# Patient Record
Sex: MALE | Race: WHITE | NOT HISPANIC OR LATINO | Employment: FULL TIME | URBAN - METROPOLITAN AREA
[De-identification: names, ages, dates, MRNs, and addresses within clinical notes are randomized per-mention and may not be internally consistent; named-entity substitution may affect disease eponyms.]

---

## 2019-10-31 ENCOUNTER — OFFICE VISIT - HEALTHEAST (OUTPATIENT)
Dept: FAMILY MEDICINE | Facility: CLINIC | Age: 49
End: 2019-10-31

## 2019-10-31 DIAGNOSIS — H60.391 INFECTIVE OTITIS EXTERNA, RIGHT: ICD-10-CM

## 2020-05-06 ENCOUNTER — HOSPITAL ENCOUNTER (EMERGENCY)
Facility: CLINIC | Age: 50
Discharge: HOME | End: 2020-05-06
Attending: EMERGENCY MEDICINE | Admitting: EMERGENCY MEDICINE
Payer: COMMERCIAL

## 2020-05-06 ENCOUNTER — APPOINTMENT (OUTPATIENT)
Dept: GENERAL RADIOLOGY | Facility: CLINIC | Age: 50
End: 2020-05-06
Attending: EMERGENCY MEDICINE
Payer: COMMERCIAL

## 2020-05-06 VITALS
OXYGEN SATURATION: 97 % | SYSTOLIC BLOOD PRESSURE: 128 MMHG | HEART RATE: 64 BPM | RESPIRATION RATE: 21 BRPM | TEMPERATURE: 98.7 F | DIASTOLIC BLOOD PRESSURE: 87 MMHG

## 2020-05-06 DIAGNOSIS — R07.9 CHEST PAIN, UNSPECIFIED TYPE: ICD-10-CM

## 2020-05-06 DIAGNOSIS — G89.29 CHRONIC BILATERAL THORACIC BACK PAIN: ICD-10-CM

## 2020-05-06 DIAGNOSIS — M54.6 CHRONIC BILATERAL THORACIC BACK PAIN: ICD-10-CM

## 2020-05-06 LAB
INTERPRETATION ECG - MUSE: NORMAL
TROPONIN I SERPL-MCNC: <0.015 UG/L (ref 0–0.04)

## 2020-05-06 PROCEDURE — 84484 ASSAY OF TROPONIN QUANT: CPT | Performed by: EMERGENCY MEDICINE

## 2020-05-06 PROCEDURE — 99285 EMERGENCY DEPT VISIT HI MDM: CPT | Mod: 25

## 2020-05-06 PROCEDURE — 71045 X-RAY EXAM CHEST 1 VIEW: CPT

## 2020-05-06 PROCEDURE — 93005 ELECTROCARDIOGRAM TRACING: CPT

## 2020-05-06 ASSESSMENT — ENCOUNTER SYMPTOMS: BACK PAIN: 1

## 2020-05-06 NOTE — LETTER
May 6, 2020      To Whom It May Concern:      Harley Isidro was seen in our Emergency Department today, 05/06/20.  I expect her condition to improve over the next 2-3 days.  He may return to work when improved.    Sincerely,        Tresa ENNIS RN

## 2020-05-06 NOTE — ED AVS SNAPSHOT
[facility] Emergency Department  [address]  [facility] [address]  Phone:  [phone]  Fax:  [phone]                                    Harley Isidro   MRN: [id number]    Department:  [facility] Emergency Department   Date of Visit:  5/6/2020           After Visit Summary Signature Page    I have received my discharge instructions, and my questions have been answered. I have discussed any challenges I see with this plan with the nurse or doctor.    ..........................................................................................................................................  Patient/Patient Representative Signature      ..........................................................................................................................................  Patient Representative Print Name and Relationship to Patient    ..................................................               ................................................  Date                                   Time    ..........................................................................................................................................  Reviewed by Signature/Title    ...................................................              ..............................................  Date                                               Time          22EPIC Rev 08/18

## 2020-05-06 NOTE — LETTER
May 6, 2020      To Whom It May Concern:      Harley Isidro was seen in our Emergency Department today, 05/06/20.  He may return to work on Friday, 05/08/2020    Sincerely,        Tresa ENNIS RN
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
## 2020-05-06 NOTE — PROGRESS NOTES
Pt reports that he has a history of nerve damage and the pain is not new. 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 States he's out of pain medication, ran out about 1 month ago. Denies cardiac history. Referred by [facility] for cardiac workup.

## 2021-06-02 NOTE — PROGRESS NOTES
Assessment and Plan  1. Infective otitis externa, right  Advised again Qtip use    - ciprofloxacin-dexamethasone (CIPRODEX) otic suspension; Administer 4 drops to the right ear 2 (two) times a day for 7 days.  Dispense: 7.5 mL; Refill: 0      Chief complaint:  Ear Pain    HPI:  Harley Isidro is a 49 y.o. male who complains of right ear pain.  It started about 2 days ago.  The pain is worse with pulling on the ear. He has no fevers, no congestion, no rhinorrhea.  He does use Q tips.  No swimming.  No loss of hearing.     He did receive the flu vaccine.    PMH:   There is no problem list on file for this patient.      No past medical history on file.    Current Medications:   Current Outpatient Medications on File Prior to Visit   Medication Sig Dispense Refill     escitalopram oxalate (LEXAPRO) 20 MG tablet Take 20 mg by mouth daily.       No current facility-administered medications on file prior to visit.        Allergies:  is allergic to codeine and dilaudid [hydromorphone].    SH/FH:  Social History and Family History reviewed and updated.   Tobacco Status:  He  reports that he has quit smoking. He smoked 0.00 packs per day. He has never used smokeless tobacco.    Review of Systems:  A 10 point review of systems was done  No fever  No headache  No visual change   ear pain, no change in hearing  No rhinorrhea, no epistasis  No sore throat, no difficulty swallowing, no voice change  No cough, no shortness of breath      Vitals:    10/31/19 1724   BP: 104/67   Patient Site: Right Arm   Patient Position: Sitting   Cuff Size: Adult Regular   Pulse: 67   Resp: 18   Temp: 98.6  F (37  C)   TempSrc: Oral   SpO2: 97%   Weight: 149 lb (67.6 kg)     Wt Readings from Last 3 Encounters:   10/31/19 149 lb (67.6 kg)       Physical Exam:  GENERAL: Alert, cooperative, well-appearing  Left ear: no pain on palpation of the pinna or tragus.  TM appears clear without erythema or effusion  Right ear: Pain on movement of the pinna and  palpation of tragus.  Ear canal is inflamed and erythematous.  TM appears normal

## 2021-06-03 VITALS
DIASTOLIC BLOOD PRESSURE: 67 MMHG | RESPIRATION RATE: 18 BRPM | OXYGEN SATURATION: 97 % | TEMPERATURE: 98.6 F | BODY MASS INDEX: 25.58 KG/M2 | SYSTOLIC BLOOD PRESSURE: 104 MMHG | HEART RATE: 67 BPM | WEIGHT: 149 LBS

## 2021-06-17 NOTE — PATIENT INSTRUCTIONS - HE
Patient Instructions by Mireya Mancera MD at 10/31/2019  5:10 PM     Author: Mireya Mancera MD Service: -- Author Type: Physician    Filed: 10/31/2019  5:45 PM Encounter Date: 10/31/2019 Status: Signed    : Mireya Mancera MD (Physician)       Patient Education     External Ear Infection (Adult)    External otitis (also called swimmers ear) is an infection in the ear canal. It is often caused by bacteria or fungus. It can occur a few days after water gets trapped in the ear canal (from swimming or bathing). It can also occur after cleaning too deeply in the ear canal with a cotton swab or other object. Sometimes, hair care products get into the ear canal and cause this problem.  Symptoms can include pain, fever, itching, redness, drainage, or swelling of the ear canal. Temporary hearing loss may also occur.  Home care    Do not try to clean the ear canal. This can push pus and bacteria deeper into the canal.    Use prescribed ear drops as directed. These help reduce swelling and fight the infection. If an ear wick was placed in the ear canal, apply drops right onto the end of the wick. The wick will draw the medicine into the ear canal even if it is swollen closed.    A cotton ball may be loosely placed in the outer ear to absorb any drainage.    You may use acetaminophen or ibuprofen to control pain, unless another medicine was prescribed. Note: If you have chronic liver or kidney disease or ever had a stomach ulcer or GI bleeding, talk to your healthcare provider before taking any of these medicines.    Do not allow water to get into your ear when bathing. Also, don't swim until the infection has cleared.  Prevention    Keep your ears dry. This helps lower the risk of infection. Dry your ears with a towel or hair dryer after getting wet. Also, use ear plugs when swimming.    Do not stick any objects in the ear to remove wax.    If you feel water trapped in your ear, use ear drops right away.  You can get these drops over the counter at most drugstores. They work by removing water from the ear canal.  Follow-up care  Follow up with your healthcare provider in 1 week, or as advised.  When to seek medical advice  Call your healthcare provider right away if any of these occur:    Ear pain becomes worse or doesnt improve after 3 days of treatment    Redness or swelling of the outer ear occurs or gets worse    Headache    Painful or stiff neck    Drowsiness or confusion    Fever of 100.4 F (38 C) or higher, or as directed by your healthcare provider    Seizure  Date Last Reviewed: 10/1/2017    [id number] [employer]. [address] [place] [address], [place] [address]. All rights reserved. This information is not intended as a substitute for professional medical care. Always follow your healthcare professional's instructions.

## 2021-07-12 ENCOUNTER — HOSPITAL ENCOUNTER (EMERGENCY)
Facility: CLINIC | Age: 51
Discharge: HOME | End: 2021-07-12
Attending: EMERGENCY MEDICINE | Admitting: EMERGENCY MEDICINE
Payer: COMMERCIAL

## 2021-07-12 ENCOUNTER — HOSPITAL ENCOUNTER (EMERGENCY)
Dept: CT IMAGING | Facility: CLINIC | Age: 51
End: 2021-07-12
Attending: EMERGENCY MEDICINE
Payer: COMMERCIAL

## 2021-07-12 VITALS
WEIGHT: 150 LBS | RESPIRATION RATE: 16 BRPM | TEMPERATURE: 98.3 F | DIASTOLIC BLOOD PRESSURE: 55 MMHG | HEART RATE: 53 BPM | OXYGEN SATURATION: 98 % | SYSTOLIC BLOOD PRESSURE: 119 MMHG | BODY MASS INDEX: 25.75 KG/M2

## 2021-07-12 DIAGNOSIS — K64.5 THROMBOSED EXTERNAL HEMORRHOIDS: ICD-10-CM

## 2021-07-12 LAB
ALBUMIN SERPL-MCNC: 4.5 G/DL (ref 3.5–5)
ALBUMIN UR-MCNC: NEGATIVE MG/DL
ALP SERPL-CCNC: 53 U/L (ref 45–120)
ALT SERPL W P-5'-P-CCNC: 22 U/L (ref 0–45)
AMORPH CRY #/AREA URNS HPF: ABNORMAL /HPF
ANION GAP SERPL CALCULATED.3IONS-SCNC: 6 MMOL/L (ref 5–18)
APPEARANCE UR: ABNORMAL
AST SERPL W P-5'-P-CCNC: 19 U/L (ref 0–40)
BASOPHILS # BLD AUTO: 0 10E3/UL (ref 0–0.2)
BASOPHILS NFR BLD AUTO: 1 %
BILIRUB SERPL-MCNC: 0.5 MG/DL (ref 0–1)
BILIRUB UR QL STRIP: NEGATIVE
BUN SERPL-MCNC: 22 MG/DL (ref 8–22)
CALCIUM SERPL-MCNC: 9.6 MG/DL (ref 8.5–10.5)
CHLORIDE BLD-SCNC: 107 MMOL/L (ref 98–107)
CO2 SERPL-SCNC: 28 MMOL/L (ref 22–31)
COLOR UR AUTO: ABNORMAL
CREAT SERPL-MCNC: 0.76 MG/DL (ref 0.6–1.3)
EOSINOPHIL # BLD AUTO: 0.1 10E3/UL (ref 0–0.7)
EOSINOPHIL NFR BLD AUTO: 1 %
ERYTHROCYTE [DISTWIDTH] IN BLOOD BY AUTOMATED COUNT: 12.6 % (ref 10–15)
GFR SERPL CREATININE-BSD FRML MDRD: >90 ML/MIN/1.73M2
GLUCOSE BLD-MCNC: 86 MG/DL (ref 70–125)
GLUCOSE UR STRIP-MCNC: NEGATIVE MG/DL
HCT VFR BLD AUTO: 46.4 % (ref 35–53)
HGB BLD-MCNC: 15.9 G/DL (ref 11.7–17.7)
HGB UR QL STRIP: ABNORMAL
IMM GRANULOCYTES # BLD: 0 10E3/UL
IMM GRANULOCYTES NFR BLD: 0 %
KETONES UR STRIP-MCNC: NEGATIVE MG/DL
LEUKOCYTE ESTERASE UR QL STRIP: NEGATIVE
LIPASE SERPL-CCNC: 29 U/L (ref 0–52)
LYMPHOCYTES # BLD AUTO: 1.8 10E3/UL (ref 0.8–5.3)
LYMPHOCYTES NFR BLD AUTO: 20 %
MCH RBC QN AUTO: 31.3 PG (ref 26.5–33)
MCHC RBC AUTO-ENTMCNC: 34.3 G/DL (ref 31.5–36.5)
MCV RBC AUTO: 91 FL (ref 78–100)
MONOCYTES # BLD AUTO: 0.5 10E3/UL (ref 0–1.3)
MONOCYTES NFR BLD AUTO: 6 %
NEUTROPHILS # BLD AUTO: 6.2 10E3/UL (ref 1.6–8.3)
NEUTROPHILS NFR BLD AUTO: 72 %
NITRATE UR QL: NEGATIVE
NRBC # BLD AUTO: 0 10E3/UL
NRBC BLD AUTO-RTO: 0 /100
PH UR STRIP: 7 [PH] (ref 5–7)
PLATELET # BLD AUTO: 99 10E3/UL (ref 150–450)
POTASSIUM BLD-SCNC: 4 MMOL/L (ref 3.5–5)
PROT SERPL-MCNC: 7.2 G/DL (ref 6–8)
RBC # BLD AUTO: 5.08 10E6/UL (ref 3.8–5.9)
RBC URINE: 2 /HPF
SODIUM SERPL-SCNC: 141 MMOL/L (ref 136–145)
SP GR UR STRIP: 1.02 (ref 1–1.03)
SQUAMOUS EPITHELIAL: <1 /HPF
UROBILINOGEN UR STRIP-MCNC: <2 MG/DL
WBC # BLD AUTO: 8.6 10E3/UL (ref 4–11)
WBC URINE: 0 /HPF

## 2021-07-12 PROCEDURE — 96361 HYDRATE IV INFUSION ADD-ON: CPT

## 2021-07-12 PROCEDURE — 250N000011 HC RX IP 250 OP 636: Performed by: EMERGENCY MEDICINE

## 2021-07-12 PROCEDURE — 96374 THER/PROPH/DIAG INJ IV PUSH: CPT | Mod: 59

## 2021-07-12 PROCEDURE — 83690 ASSAY OF LIPASE: CPT | Performed by: EMERGENCY MEDICINE

## 2021-07-12 PROCEDURE — 99285 EMERGENCY DEPT VISIT HI MDM: CPT | Mod: 25

## 2021-07-12 PROCEDURE — 85041 AUTOMATED RBC COUNT: CPT | Performed by: EMERGENCY MEDICINE

## 2021-07-12 PROCEDURE — 81001 URINALYSIS AUTO W/SCOPE: CPT | Performed by: EMERGENCY MEDICINE

## 2021-07-12 PROCEDURE — 36592 COLLECT BLOOD FROM PICC: CPT | Performed by: EMERGENCY MEDICINE

## 2021-07-12 PROCEDURE — 46230 REMOVAL OF ANAL TAGS: CPT

## 2021-07-12 PROCEDURE — 74177 CT ABD & PELVIS W/CONTRAST: CPT

## 2021-07-12 PROCEDURE — 82040 ASSAY OF SERUM ALBUMIN: CPT | Performed by: EMERGENCY MEDICINE

## 2021-07-12 PROCEDURE — 258N000003 HC RX IP 258 OP 636: Performed by: EMERGENCY MEDICINE

## 2021-07-12 PROCEDURE — 96375 TX/PRO/DX INJ NEW DRUG ADDON: CPT

## 2021-07-12 RX ORDER — CEFTRIAXONE 1 G/1
1 INJECTION, POWDER, FOR SOLUTION INTRAMUSCULAR; INTRAVENOUS ONCE
Status: DISCONTINUED | OUTPATIENT
Start: 2021-07-12 | End: 2021-07-12

## 2021-07-12 RX ORDER — DOCUSATE SODIUM 100 MG/1
100 CAPSULE, LIQUID FILLED ORAL 2 TIMES DAILY
Qty: 28 CAPSULE | Refills: 0 | Status: SHIPPED | OUTPATIENT
Start: 2021-07-12 | End: 2021-07-26

## 2021-07-12 RX ORDER — KETOROLAC TROMETHAMINE 15 MG/ML
15 INJECTION, SOLUTION INTRAMUSCULAR; INTRAVENOUS ONCE
Status: COMPLETED | OUTPATIENT
Start: 2021-07-12 | End: 2021-07-12

## 2021-07-12 RX ORDER — SODIUM CHLORIDE 9 MG/ML
INJECTION, SOLUTION INTRAVENOUS CONTINUOUS
Status: DISCONTINUED | OUTPATIENT
Start: 2021-07-12 | End: 2021-07-12 | Stop reason: HOSPADM

## 2021-07-12 RX ORDER — MORPHINE SULFATE 4 MG/ML
4 INJECTION, SOLUTION INTRAMUSCULAR; INTRAVENOUS ONCE
Status: COMPLETED | OUTPATIENT
Start: 2021-07-12 | End: 2021-07-12

## 2021-07-12 RX ORDER — OXYCODONE AND ACETAMINOPHEN 5; 325 MG/1; MG/1
1-2 TABLET ORAL EVERY 4 HOURS PRN
Qty: 12 TABLET | Refills: 0 | Status: SHIPPED | OUTPATIENT
Start: 2021-07-12

## 2021-07-12 RX ORDER — IOPAMIDOL 755 MG/ML
100 INJECTION, SOLUTION INTRAVASCULAR ONCE
Status: COMPLETED | OUTPATIENT
Start: 2021-07-12 | End: 2021-07-12

## 2021-07-12 RX ADMIN — SODIUM CHLORIDE 1000 ML: 9 INJECTION, SOLUTION INTRAVENOUS at 12:25

## 2021-07-12 RX ADMIN — IOPAMIDOL 100 ML: 755 INJECTION, SOLUTION INTRAVENOUS at 13:25

## 2021-07-12 RX ADMIN — MORPHINE SULFATE 4 MG: 4 INJECTION, SOLUTION INTRAMUSCULAR; INTRAVENOUS at 13:09

## 2021-07-12 RX ADMIN — KETOROLAC TROMETHAMINE 15 MG: 15 INJECTION, SOLUTION INTRAMUSCULAR; INTRAVENOUS at 12:23

## 2021-07-12 ASSESSMENT — ENCOUNTER SYMPTOMS
DYSURIA: 0
ANAL BLEEDING: 1
ABDOMINAL PAIN: 1
HEMATURIA: 0
CONSTIPATION: 1
NAUSEA: 1

## 2021-07-12 NOTE — ED PROVIDER NOTES
EMERGENCY DEPARTMENT ENCOUNTER      NAME: Harley Isidro  AGE: 51 year old adult  YOB: 1970  MRN: [id number]  EVALUATION DATE & TIME: 2021 11:16 AM    [facility]    ED PROVIDER: DESTINY Bello    Chief Complaint   Patient presents with     Hemorrhoids     Abdominal Pain     FINAL IMPRESSION:  1. Thrombosed external hemorrhoids        ED COURSE & MEDICAL DECISION MAKIN:00 PM I met with the patient for the initial interview and physical examination. Discussed plan for treatment and workup in the ED.   1:33 PM I rechecked and updated the patient.      Pertinent Labs & Imaging studies reviewed. (See chart for details)  51 year old adult presents to the Emergency Department for evaluation of right lower quadrant abdominal pain and a painful hemorrhoid.  Patient reports history of hemorrhoids but never had when this painful before.  Is been using over-the-counter medications without control of his symptoms.  Also noted a mild right lower abdominal pain.  On clinical examination noted to have normal vital signs.  He had mild tenderness palpation of the right lower quadrant without other concerning exam findings relative to that area.  On rectal examination there was a thrombosed external hemorrhoid roughly the size of a grape.  It was tender to palpation.  Clinical examination otherwise unremarkable.  The patient's right lower quadrant pain he had laboratory testing and urinalysis for further assessment.  Laboratory testing unremarkable urinalysis negative.  Patient is transgender female to male conversion status post bilateral for ectomy and hysterectomy.  Relative to the abdominal pain no clear source identified although could be related to referred pain from his hemorrhoid.  I do not feel that further work-up relative the abdominal pain was indicated at this time.  I discussed her options with the patient relative to the thrombosed hemorrhoid.  After  discussion of the attendant risk and benefits of various approaches patient requested thrombectomy.  This was performed in the emergency but without complication and patient felt improved afterwards.  Plan of care for discharge.  Short course of pain medication short course of antibiotics and stool softeners for management of his symptoms.  He is going to follow-up with colorectal surgery.  Patient was comfortable this plan of care.  Reviewed precautions prior to discharge.    Plan and all results were discussed  Time was taken to answer all questions. Patient and/or associated parties expressed understanding and were agreeable to the treatment plan.  Warning signs and ER return precautions provided. Discharged with discharge instructions outlining plan for further care and follow up.     PPE: Provider wore gloves, N95 mask, eye protection, surgical cap, and paper mask.    MEDICATIONS GIVEN IN THE EMERGENCY:  Medications   0.9% sodium chloride BOLUS (0 mLs Intravenous Stopped 7/12/21 1449)     Followed by   sodium chloride 0.9% infusion (has no administration in time range)   ketorolac (TORADOL) injection 15 mg (15 mg Intravenous Given 7/12/21 1223)   morphine (PF) injection 4 mg (4 mg Intravenous Given 7/12/21 1309)       NEW PRESCRIPTIONS STARTED AT TODAY'S ER VISIT  Discharge Medication List as of 7/12/2021  2:50 PM      START taking these medications    Details   amoxicillin-clavulanate (AUGMENTIN) 875-125 MG tablet Take 1 tablet by mouth 2 times daily for 5 days, Disp-10 tablet, R-0, Local Print      docusate sodium (COLACE) 100 MG capsule Take 1 capsule (100 mg) by mouth 2 times daily for 14 days, Disp-28 capsule, R-0, Local Print      oxyCODONE-acetaminophen (PERCOCET) 5-325 MG tablet Take 1-2 tablets by mouth every 4 hours as needed for pain, Disp-12 tablet, R-0, Local Print           =================================================================    HPI    Patient information was obtained from:  patient    Use of Intrepreter: N/A         Ace LOLI Sanna is a 51 year old adult with a pertinent medical history of colonic polyps who presents to the ED via walk-in for evaluation of hemorrhoid and RLQ pain.    Patient reports having a large, bleeding hemorrhoid for the past 5 days. He endorses rectal pain and RLQ abdominal pain that produces a shooting pain upon palpation. Patient has history of hemorrhoids but states that they are not usually this large or last this long. Patient has been using Preparation-H cream and wipes to keep the affected area clean. He endorses some mild constipation. Patient notes that he has recently had nausea and upper abdominal pain before or after eating and was told he may possibly have gallstones but has not yet followed up on this. Patient denies previous appendectomy. Patient denies dysuria, hematuria, and any other symptoms or complaints at this time.       REVIEW OF SYSTEMS   Review of Systems   Gastrointestinal: Positive for abdominal pain (RLQ), anal bleeding (hemorrhoid ), constipation (mild) and nausea (episodic).   Genitourinary: Negative for dysuria and hematuria.   All other systems reviewed and are negative.       PAST MEDICAL HISTORY:  Hemorrhoids    PAST SURGICAL HISTORY:  Hysterectomy  Oophorectomy    CURRENT MEDICATIONS:    Discharge Medication List as of 7/12/2021  2:50 PM      START taking these medications    Details   amoxicillin-clavulanate (AUGMENTIN) 875-125 MG tablet Take 1 tablet by mouth 2 times daily for 5 days, Disp-10 tablet, R-0, Local Print      docusate sodium (COLACE) 100 MG capsule Take 1 capsule (100 mg) by mouth 2 times daily for 14 days, Disp-28 capsule, R-0, Local Print      oxyCODONE-acetaminophen (PERCOCET) 5-325 MG tablet Take 1-2 tablets by mouth every 4 hours as needed for pain, Disp-12 tablet, R-0, Local Print         CONTINUE these medications which have NOT CHANGED    Details   MELATONIN PO Historical      sertraline (ZOLOFT) 100 MG  tablet Take 200 mg by mouth daily., 200 mg, Oral, DAILY, Until Discontinued, Historical      simvastatin (ZOCOR) 40 MG tablet Take 40 mg by mouth., 40 mg, Oral, Until Discontinued, Historical      testosterone (ANDROGEL 1.62% PUMP) 20.25 MG/ACT gel Apply 3 pumps (60.75 mg) topically daily, Disp-1 Month, R-1, Local Print      VITAMIN D, CHOLECALCIFEROL, PO Take 4,000 Units by mouth daily, Historical               ALLERGIES:  Allergies   Allergen Reactions     Codeine Sulfate      Dilaudid Cough        FAMILY HISTORY:  No family history on file.    SOCIAL HISTORY:   Social History     Socioeconomic History     Marital status:      Spouse name: Not on file     Number of children: Not on file     Years of education: Not on file     Highest education level: Not on file   Occupational History     Not on file   Tobacco Use     Smoking status: Former Smoker     Packs/day: 1.00     Types: Cigarettes, Cigarettes     Quit date: 7/3/2012     Years since quittin.0     Smokeless tobacco: Never Used   Substance and Sexual Activity     Alcohol use: Not on file     Drug use: Not on file     Sexual activity: Not on file   Other Topics Concern     Parent/sibling w/ CABG, MI or angioplasty before 65F 55M? Not Asked   Social History Narrative    9/15/2020: Ace began smoking when he was 16 years old with 1 ppd history. Has since quit smoking cigarettes. Ace is .     Social Determinants of Health     Financial Resource Strain:      Difficulty of Paying Living Expenses:    Food Insecurity:      Worried About Running Out of Food in the Last Year:      Ran Out of Food in the Last Year:    Transportation Needs:      Lack of Transportation (Medical):      Lack of Transportation (Non-Medical):    Physical Activity:      Days of Exercise per Week:      Minutes of Exercise per Session:    Stress:      Feeling of Stress :    Social Connections:      Frequency of Communication with Friends and Family:      Frequency of Social  Gatherings with Friends and Family:      Attends Zoroastrianism Services:      Active Member of Clubs or Organizations:      Attends Club or Organization Meetings:      Marital Status:    Intimate Partner Violence:      Fear of Current or Ex-Partner:      Emotionally Abused:      Physically Abused:      Sexually Abused:        VITALS:  Patient Vitals for the past 24 hrs:   BP Temp Pulse Resp SpO2 Weight   07/12/21 1419 119/55 -- 53 -- 98 % --   07/12/21 1315 (!) 154/87 -- 65 -- 99 % --   07/12/21 1245 122/68 -- 56 -- 97 % --   07/12/21 1233 124/71 -- 52 -- 97 % --   07/12/21 1015 (!) 155/71 98.3  F (36.8  C) 80 16 96 % 68 kg (150 lb)       PHYSICAL EXAM    Constitutional:  Well developed, Well nourished, NAD  HENT:  Normocephalic, Atraumatic, Bilateral external ears normal, Oropharynx moist Nose normal.   Neck: Normal range of motion   Eyes: Conjunctiva normal, No discharge.   Respiratory:  Normal breath sounds, No respiratory distress, No wheezing.  No cough.  Cardiovascular:  Normal heart rate, Normal rhythm. No murmur appreciated.  Chest wall non-tender.  GI:   Mild right lower quadrant pain to palpation no guarding or peritoneal signs appreciable distention or palpable masses.  : No CVA tenderness there is a thrombosed external hemorrhoid in the 10 o'clock position roughly 1.5 cm in size tender to palpation.  No active bleeding although there is an area of a couple millimeters in the central area the hemorrhoid that appear to be previously bleeding.  Musculoskeletal: Full ROM.  No edema appreciated.  No cyanosis. Good ROM of major joints.  Integument:  Warm, Dry, No erythema, No rash.    Neurologic:  Alert & oriented.  No focal deficits appreciated.  Ambulatory.  Psychiatric:  Affect normal, Judgment normal, Mood normal.     LAB:  All pertinent labs reviewed and interpreted.  Results for orders placed or performed during the hospital encounter of 07/12/21   CT Abdomen Pelvis w Contrast    Impression    IMPRESSION:      1.  No acute infectious or inflammatory process in the abdomen or pelvis. Normal appendix.  2.  Punctate nonobstructing calculus mid left kidney.   Comprehensive metabolic panel   Result Value Ref Range    Sodium 141 136 - 145 mmol/L    Potassium 4.0 3.5 - 5.0 mmol/L    Chloride 107 98 - 107 mmol/L    Carbon Dioxide (CO2) 28 22 - 31 mmol/L    Anion Gap 6 5 - 18 mmol/L    Urea Nitrogen 22 8 - 22 mg/dL    Creatinine 0.76 0.60 - 1.30 mg/dL    Calcium 9.6 8.5 - 10.5 mg/dL    Glucose 86 70 - 125 mg/dL    Alkaline Phosphatase 53 45 - 120 U/L    AST 19 0 - 40 U/L    ALT 22 0 - 45 U/L    Protein Total 7.2 6.0 - 8.0 g/dL    Albumin 4.5 3.5 - 5.0 g/dL    Bilirubin Total 0.5 0.0 - 1.0 mg/dL    GFR Estimate >90 >60 mL/min/1.73m2   Result Value Ref Range    Lipase 29 0 - 52 U/L   UA with Microscopic reflex to Culture    Specimen: Urine, Midstream   Result Value Ref Range    Color Urine Light Yellow Colorless, Straw, Light Yellow, Yellow    Appearance Urine Turbid (A) Clear    Glucose Urine Negative Negative mg/dL    Bilirubin Urine Negative Negative    Ketones Urine Negative Negative mg/dL    Specific Gravity Urine 1.016 1.001 - 1.030    Blood Urine 0.1 mg/dL (A) Negative    pH Urine 7.0 5.0 - 7.0    Protein Albumin Urine Negative Negative mg/dL    Urobilinogen Urine <2.0 <2.0 mg/dL    Nitrite Urine Negative Negative    Leukocyte Esterase Urine Negative Negative    Amorphous Crystals Urine Few (A) None Seen /HPF    RBC Urine 2 <=2 /HPF    WBC Urine 0 <=5 /HPF    Squamous Epithelials Urine <1 <=1 /HPF   CBC with platelets and differential   Result Value Ref Range    WBC Count 8.6 4.0 - 11.0 10e3/uL    RBC Count 5.08 3.80 - 5.90 10e6/uL    Hemoglobin 15.9 11.7 - 17.7 g/dL    Hematocrit 46.4 35.0 - 53.0 %    MCV 91 78 - 100 fL    MCH 31.3 26.5 - 33.0 pg    MCHC 34.3 31.5 - 36.5 g/dL    RDW 12.6 10.0 - 15.0 %    Platelet Count 99 (L) 150 - 450 10e3/uL    % Neutrophils 72 %    % Lymphocytes 20 %    % Monocytes 6 %    %  Eosinophils 1 %    % Basophils 1 %    % Immature Granulocytes 0 %    NRBCs per 100 WBC 0 <1 /100    Absolute Neutrophils 6.2 1.6 - 8.3 10e3/uL    Absolute Lymphocytes 1.8 0.8 - 5.3 10e3/uL    Absolute Monocytes 0.5 0.0 - 1.3 10e3/uL    Absolute Eosinophils 0.1 0.0 - 0.7 10e3/uL    Absolute Basophils 0.0 0.0 - 0.2 10e3/uL    Absolute Immature Granulocytes 0.0 <=0.0 10e3/uL    Absolute NRBCs 0.0 10e3/uL       RADIOLOGY:  Reviewed all pertinent imaging. Please see official radiology report.  CT Abdomen Pelvis w Contrast   Final Result   IMPRESSION:       1.  No acute infectious or inflammatory process in the abdomen or pelvis. Normal appendix.   2.  Punctate nonobstructing calculus mid left kidney.        PROCEDURES:   Procedures  Thrombectomy  Consent obtained  Explained to the patient the intended benefits and risks of the procedure.  After obtaining consent proximately 4 cc of lidocaine 1% with epinephrine was instilled into the hemorrhoid and surrounding tissue.  Patient tolerated well and anesthesia was achieved.  Subsequently an elliptical incision was made overlying the hemorrhoid and a clot was extracted.  There was minimal bleeding with the procedure and patient tolerated well no complications.      I, Tari Pearson, am serving as a scribe to document services personally performed by Dr. Bello based on my observation and the provider's statements to me. I, Juan Bello MD attest that aTri Pearson is acting in a scribe capacity, has observed my performance of the services and has documented them in accordance with my direction.    Juan Bello M.D.  Emergency Medicine  [facility] EMERGENCY ROOM  [address] [id number] [phone]  Dept: [phone]       Juan Bello MD  07/12/21 7405

## 2021-07-12 NOTE — DISCHARGE INSTRUCTIONS
Take antibiotics as prescribed. Pain medications persistence of your discomfort. Please contact colorectal surgery for appointment later this week. You have escalation your symptoms or additional concern develops please return to emergency department repeat assessment.

## 2021-07-12 NOTE — Clinical Note
Harley Isidro was seen and treated in our emergency department on 7/12/2021.  He may return to work on 07/15/2021.       If you have any questions or concerns, please don't hesitate to call.      Juan Bello MD

## 2021-12-30 ENCOUNTER — TRANSFERRED RECORDS (OUTPATIENT)
Dept: HEALTH INFORMATION MANAGEMENT | Facility: CLINIC | Age: 51
End: 2021-12-30

## 2022-01-18 ENCOUNTER — TRANSFERRED RECORDS (OUTPATIENT)
Dept: HEALTH INFORMATION MANAGEMENT | Facility: CLINIC | Age: 52
End: 2022-01-18

## 2022-02-08 ENCOUNTER — HOSPITAL ENCOUNTER (EMERGENCY)
Facility: CLINIC | Age: 52
Discharge: HOME | End: 2022-02-08
Attending: EMERGENCY MEDICINE | Admitting: EMERGENCY MEDICINE
Payer: COMMERCIAL

## 2022-02-08 ENCOUNTER — APPOINTMENT (OUTPATIENT)
Dept: CT IMAGING | Facility: CLINIC | Age: 52
End: 2022-02-08
Attending: EMERGENCY MEDICINE
Payer: COMMERCIAL

## 2022-02-08 VITALS
TEMPERATURE: 98.2 F | OXYGEN SATURATION: 96 % | RESPIRATION RATE: 18 BRPM | HEIGHT: 64 IN | SYSTOLIC BLOOD PRESSURE: 139 MMHG | HEART RATE: 74 BPM | WEIGHT: 145 LBS | DIASTOLIC BLOOD PRESSURE: 78 MMHG | BODY MASS INDEX: 24.75 KG/M2

## 2022-02-08 DIAGNOSIS — R10.13 EPIGASTRIC PAIN: ICD-10-CM

## 2022-02-08 DIAGNOSIS — K29.00 ACUTE GASTRITIS WITHOUT HEMORRHAGE, UNSPECIFIED GASTRITIS TYPE: ICD-10-CM

## 2022-02-08 LAB
ALBUMIN SERPL-MCNC: 4.3 G/DL (ref 3.4–5)
ALBUMIN UR-MCNC: 10 MG/DL
ALP SERPL-CCNC: 56 U/L (ref 40–150)
ALT SERPL W P-5'-P-CCNC: 33 U/L (ref 0–70)
ANION GAP SERPL CALCULATED.3IONS-SCNC: 3 MMOL/L (ref 3–14)
APPEARANCE UR: CLEAR
AST SERPL W P-5'-P-CCNC: 18 U/L (ref 0–45)
ATRIAL RATE - MUSE: 66 BPM
BASOPHILS # BLD AUTO: 0 10E3/UL (ref 0–0.2)
BASOPHILS NFR BLD AUTO: 0 %
BILIRUB DIRECT SERPL-MCNC: 0.1 MG/DL (ref 0–0.2)
BILIRUB SERPL-MCNC: 0.4 MG/DL (ref 0.2–1.3)
BILIRUB UR QL STRIP: NEGATIVE
BUN SERPL-MCNC: 19 MG/DL (ref 7–30)
CALCIUM SERPL-MCNC: 8.9 MG/DL (ref 8.5–10.1)
CHLORIDE BLD-SCNC: 110 MMOL/L (ref 94–109)
CO2 SERPL-SCNC: 28 MMOL/L (ref 20–32)
COLOR UR AUTO: ABNORMAL
CREAT SERPL-MCNC: 0.77 MG/DL (ref 0.52–1.25)
DIASTOLIC BLOOD PRESSURE - MUSE: NORMAL MMHG
EOSINOPHIL # BLD AUTO: 0.1 10E3/UL (ref 0–0.7)
EOSINOPHIL NFR BLD AUTO: 1 %
ERYTHROCYTE [DISTWIDTH] IN BLOOD BY AUTOMATED COUNT: 13 % (ref 10–15)
GFR SERPL CREATININE-BSD FRML MDRD: >90 ML/MIN/1.73M2
GLUCOSE BLD-MCNC: 133 MG/DL (ref 70–99)
GLUCOSE UR STRIP-MCNC: NEGATIVE MG/DL
HCT VFR BLD AUTO: 47.1 % (ref 35–53)
HGB BLD-MCNC: 16 G/DL (ref 11.7–17.7)
HGB UR QL STRIP: NEGATIVE
HOLD SPECIMEN: NORMAL
IMM GRANULOCYTES # BLD: 0 10E3/UL
IMM GRANULOCYTES NFR BLD: 0 %
INTERPRETATION ECG - MUSE: NORMAL
KETONES UR STRIP-MCNC: 10 MG/DL
LACTATE SERPL-SCNC: 0.9 MMOL/L (ref 0.7–2)
LEUKOCYTE ESTERASE UR QL STRIP: NEGATIVE
LIPASE SERPL-CCNC: 148 U/L (ref 73–393)
LYMPHOCYTES # BLD AUTO: 1.1 10E3/UL (ref 0.8–5.3)
LYMPHOCYTES NFR BLD AUTO: 11 %
MCH RBC QN AUTO: 31.3 PG (ref 26.5–33)
MCHC RBC AUTO-ENTMCNC: 34 G/DL (ref 31.5–36.5)
MCV RBC AUTO: 92 FL (ref 78–100)
MONOCYTES # BLD AUTO: 0.4 10E3/UL (ref 0–1.3)
MONOCYTES NFR BLD AUTO: 4 %
MUCOUS THREADS #/AREA URNS LPF: PRESENT /LPF
NEUTROPHILS # BLD AUTO: 8.3 10E3/UL (ref 1.6–8.3)
NEUTROPHILS NFR BLD AUTO: 84 %
NITRATE UR QL: NEGATIVE
NRBC # BLD AUTO: 0 10E3/UL
NRBC BLD AUTO-RTO: 0 /100
P AXIS - MUSE: 67 DEGREES
PH UR STRIP: 7.5 [PH] (ref 5–7)
PLATELET # BLD AUTO: 108 10E3/UL (ref 150–450)
POTASSIUM BLD-SCNC: 4 MMOL/L (ref 3.4–5.3)
PR INTERVAL - MUSE: 154 MS
PROT SERPL-MCNC: 7.7 G/DL (ref 6.8–8.8)
QRS DURATION - MUSE: 80 MS
QT - MUSE: 394 MS
QTC - MUSE: 413 MS
R AXIS - MUSE: 82 DEGREES
RBC # BLD AUTO: 5.12 10E6/UL (ref 3.8–5.9)
RBC URINE: 1 /HPF
SODIUM SERPL-SCNC: 141 MMOL/L (ref 133–144)
SP GR UR STRIP: 1 (ref 1–1.03)
SQUAMOUS EPITHELIAL: 1 /HPF
SYSTOLIC BLOOD PRESSURE - MUSE: NORMAL MMHG
T AXIS - MUSE: 40 DEGREES
TROPONIN I SERPL HS-MCNC: 6 NG/L
UROBILINOGEN UR STRIP-MCNC: NORMAL MG/DL
VENTRICULAR RATE- MUSE: 66 BPM
WBC # BLD AUTO: 10 10E3/UL (ref 4–11)
WBC URINE: <1 /HPF

## 2022-02-08 PROCEDURE — 74177 CT ABD & PELVIS W/CONTRAST: CPT

## 2022-02-08 PROCEDURE — 96375 TX/PRO/DX INJ NEW DRUG ADDON: CPT | Performed by: EMERGENCY MEDICINE

## 2022-02-08 PROCEDURE — 96361 HYDRATE IV INFUSION ADD-ON: CPT | Performed by: EMERGENCY MEDICINE

## 2022-02-08 PROCEDURE — 250N000013 HC RX MED GY IP 250 OP 250 PS 637: Performed by: EMERGENCY MEDICINE

## 2022-02-08 PROCEDURE — 96374 THER/PROPH/DIAG INJ IV PUSH: CPT | Mod: 59 | Performed by: EMERGENCY MEDICINE

## 2022-02-08 PROCEDURE — 93005 ELECTROCARDIOGRAM TRACING: CPT | Performed by: EMERGENCY MEDICINE

## 2022-02-08 PROCEDURE — 99285 EMERGENCY DEPT VISIT HI MDM: CPT | Mod: 25 | Performed by: EMERGENCY MEDICINE

## 2022-02-08 PROCEDURE — 84484 ASSAY OF TROPONIN QUANT: CPT | Performed by: EMERGENCY MEDICINE

## 2022-02-08 PROCEDURE — 250N000011 HC RX IP 250 OP 636: Performed by: EMERGENCY MEDICINE

## 2022-02-08 PROCEDURE — 80048 BASIC METABOLIC PNL TOTAL CA: CPT | Performed by: EMERGENCY MEDICINE

## 2022-02-08 PROCEDURE — 36415 COLL VENOUS BLD VENIPUNCTURE: CPT | Performed by: EMERGENCY MEDICINE

## 2022-02-08 PROCEDURE — 83605 ASSAY OF LACTIC ACID: CPT | Performed by: EMERGENCY MEDICINE

## 2022-02-08 PROCEDURE — 93010 ELECTROCARDIOGRAM REPORT: CPT | Performed by: EMERGENCY MEDICINE

## 2022-02-08 PROCEDURE — 82248 BILIRUBIN DIRECT: CPT | Performed by: EMERGENCY MEDICINE

## 2022-02-08 PROCEDURE — 258N000003 HC RX IP 258 OP 636: Performed by: EMERGENCY MEDICINE

## 2022-02-08 PROCEDURE — 85014 HEMATOCRIT: CPT | Performed by: EMERGENCY MEDICINE

## 2022-02-08 PROCEDURE — 250N000009 HC RX 250: Performed by: EMERGENCY MEDICINE

## 2022-02-08 PROCEDURE — 83690 ASSAY OF LIPASE: CPT | Performed by: EMERGENCY MEDICINE

## 2022-02-08 PROCEDURE — 81001 URINALYSIS AUTO W/SCOPE: CPT | Performed by: EMERGENCY MEDICINE

## 2022-02-08 PROCEDURE — 74177 CT ABD & PELVIS W/CONTRAST: CPT | Mod: 26 | Performed by: RADIOLOGY

## 2022-02-08 RX ORDER — ONDANSETRON 2 MG/ML
4 INJECTION INTRAMUSCULAR; INTRAVENOUS ONCE
Status: COMPLETED | OUTPATIENT
Start: 2022-02-08 | End: 2022-02-08

## 2022-02-08 RX ORDER — SODIUM CHLORIDE 9 MG/ML
INJECTION, SOLUTION INTRAVENOUS CONTINUOUS
Status: DISCONTINUED | OUTPATIENT
Start: 2022-02-08 | End: 2022-02-08 | Stop reason: HOSPADM

## 2022-02-08 RX ORDER — ONDANSETRON 4 MG/1
4 TABLET, ORALLY DISINTEGRATING ORAL EVERY 8 HOURS PRN
Qty: 9 TABLET | Refills: 0 | Status: SHIPPED | OUTPATIENT
Start: 2022-02-08 | End: 2022-02-11

## 2022-02-08 RX ORDER — MORPHINE SULFATE 4 MG/ML
4 INJECTION, SOLUTION INTRAMUSCULAR; INTRAVENOUS ONCE
Status: COMPLETED | OUTPATIENT
Start: 2022-02-08 | End: 2022-02-08

## 2022-02-08 RX ORDER — ZINC GLUCONATE 50 MG
50 TABLET ORAL DAILY
COMMUNITY

## 2022-02-08 RX ORDER — FAMOTIDINE 10 MG
10 TABLET ORAL 2 TIMES DAILY PRN
Qty: 28 TABLET | Refills: 0 | Status: SHIPPED | OUTPATIENT
Start: 2022-02-08 | End: 2022-02-22

## 2022-02-08 RX ORDER — IOPAMIDOL 755 MG/ML
89 INJECTION, SOLUTION INTRAVASCULAR ONCE
Status: COMPLETED | OUTPATIENT
Start: 2022-02-08 | End: 2022-02-08

## 2022-02-08 RX ORDER — ESCITALOPRAM OXALATE 20 MG/1
20 TABLET ORAL DAILY
COMMUNITY

## 2022-02-08 RX ADMIN — ONDANSETRON 4 MG: 2 INJECTION INTRAMUSCULAR; INTRAVENOUS at 09:48

## 2022-02-08 RX ADMIN — IOPAMIDOL 89 ML: 755 INJECTION, SOLUTION INTRAVENOUS at 12:00

## 2022-02-08 RX ADMIN — LIDOCAINE HYDROCHLORIDE 30 ML: 20 SOLUTION ORAL; TOPICAL at 09:48

## 2022-02-08 RX ADMIN — SODIUM CHLORIDE: 9 INJECTION, SOLUTION INTRAVENOUS at 11:39

## 2022-02-08 RX ADMIN — MORPHINE SULFATE 4 MG: 4 INJECTION INTRAVENOUS at 11:46

## 2022-02-08 RX ADMIN — FAMOTIDINE 20 MG: 10 INJECTION, SOLUTION INTRAVENOUS at 12:50

## 2022-02-08 RX ADMIN — SODIUM CHLORIDE 1000 ML: 9 INJECTION, SOLUTION INTRAVENOUS at 09:48

## 2022-02-08 ASSESSMENT — MIFFLIN-ST. JEOR: SCORE: 1418.72

## 2022-02-08 NOTE — ED PROVIDER NOTES
"ED Provider Note  [facility]      History     Chief Complaint   Patient presents with     Abdominal Pain     HPI  Harley Isidro is a 51 year old adult (transgender male) with a pertinent history of cervical cancer s/p hysterectomy and oophorectomy, hemorrhoids, colonic polyps, hyperlipidemia, and chronic back pack pain s/p radiofrequency neurotomy who presents to the ED with mid epigastric pain, nausea, and vomiting.     The patient reports that he has had epigastric pain, nausea, and occasional vomiting that has been on-and-off for the last 3 months, but his symptoms are more severe today. He says his pain worsened at 7:30am after beginning his shift and he vomited 4 times soon after. He denies blood in his vomit and says it had a consistency similar to the muscle milk he drank this morning. No coffee ground emesis. His pain was so severe that he called the ambulance. He describes the epigastric pain as burning, squeezing, and twisting. He says taking Tylenol has not helped. He denies having a history of GERD and recalls having a recent endoscopy 2 weeks ago to assess for Celiac's. He also states that he had 2 bowel movements, once before and once after his vomiting episodes. He said his stool appeared brown, thin, and soft (not diarrhea) which is normal for him. No melena or hematochezia. He denies belching but has noticed a lot of flatulence over the last few months. He says he tested positive for COVID-19 two weeks ago and returned to work last week. He reports he is not taking any medication for this at home.     Per care everywhere review, EGD with [facility] showed \"mild gastropathy as well as duodenitis and while stomach histology was negative for H. pylori, duodenal biopsies demonstrated changes suspicion for celiac disease with Marsh 3C changes typified by villous atrophy and crypt hyperplasia as well as increased intraepithelial lymphocytes. Remainder of serologies was not " "consistent with this diagnosis. The low total IgA levels should at least place the IgA serologies as indeterminate and thus we still have not confirmed whether the patient does have celiac disease.\" Last saw GI 22 who recommended gluten free diet.     Past Medical History  Chronic back pain s/p radiofrequency neurotomy  Migraine headaches  Hirsutism  Nephrolithiasis s/p lithotripsy  Thrombocytopenia  Hemorrhoids  Hyperlipidemia  SCC of cervix    Past Surgical History  Total abdominal hysterectomy and oophorectomy ()  Tonsilectomy/adenoidectomy      Allergies   Allergen Reactions     Codeine Sulfate      Dilaudid Cough      Family History  History reviewed. No pertinent family history.  Social History   Social History     Tobacco Use     Smoking status: Former Smoker     Packs/day: 1.00     Types: Cigarettes     Quit date: 7/3/2012     Years since quittin.6     Smokeless tobacco: Never Used   Substance Use Topics     Alcohol use: Yes     Comment: social      Drug use: Never      Past medical history, past surgical history, medications, allergies, family history, and social history were reviewed with the patient. No additional pertinent items.       Review of Systems  A complete review of systems was performed with pertinent positives and negatives noted in the HPI, and all other systems negative.    Physical Exam   BP: (!) 132/95  Pulse: 65  Temp: 98.2  F (36.8  C)  Resp: 18  Height: 162.6 cm (5' 4\")  Weight: 65.8 kg (145 lb)  SpO2: 94 %  Physical Exam  Vitals reviewed.   Constitutional:       General: He is not in acute distress.     Appearance: He is well-developed.   HENT:      Head: Normocephalic and atraumatic.   Eyes:      Extraocular Movements: Extraocular movements intact.      Pupils: Pupils are equal, round, and reactive to light.   Cardiovascular:      Rate and Rhythm: Normal rate and regular rhythm.      Pulses: Normal pulses.      Heart sounds: Normal heart sounds. No murmur " heard.  Pulmonary:      Effort: Pulmonary effort is normal. No respiratory distress.      Breath sounds: Normal breath sounds. No wheezing or rales.   Abdominal:      General: Bowel sounds are normal. There is no distension.      Palpations: Abdomen is soft. There is no mass.      Tenderness: There is no right CVA tenderness, left CVA tenderness, guarding or rebound.      Comments: Mild epigastric tenderness   Musculoskeletal:         General: Normal range of motion.      Cervical back: Normal range of motion and neck supple. No rigidity.   Skin:     General: Skin is warm and dry.      Capillary Refill: Capillary refill takes less than 2 seconds.      Findings: No rash.   Neurological:      General: No focal deficit present.      Mental Status: He is alert and oriented to person, place, and time.      GCS: GCS eye subscore is 4. GCS verbal subscore is 5. GCS motor subscore is 6.      Cranial Nerves: No cranial nerve deficit.      Sensory: No sensory deficit.      Motor: No weakness.   Psychiatric:         Mood and Affect: Mood normal.           ED Course      Procedures            EKG Interpretation:      Interpreted by Terri Baldwin MD  Time reviewed: 10:10 am  Symptoms at time of EKG: epigastric pain   Rhythm: normal sinus   Rate: normal  Axis: normal  Ectopy: none  Conduction: normal  ST Segments/ T Waves: No ST-T wave changes  Q Waves: none  Comparison to prior: Unchanged    Clinical Impression: no evidence of acute ischemia         The medical record was reviewed and interpreted.  Current labs reviewed and interpreted.  Current images reviewed and interpreted: as below.  Previous images reviewed and interpreted: as above.  EKG reviewed and interpreted: as above.  Managed outpatient prescription medications.              Results for orders placed or performed during the hospital encounter of 02/08/22   CT Abdomen Pelvis w Contrast     Status: None    Narrative    EXAMINATION: CT ABDOMEN PELVIS W CONTRAST,  2/8/2022 12:05 PM    TECHNIQUE:  Helical CT of the abdomen and pelvis with contrast.   Coronal and sagittal reformatted images were created, archived and  reviewed at the workstation for further assessment.    CONTRAST: 89 cc [id number].    COMPARISON: None.    HISTORY: epigastric abdominal pain, vomiting, difficulty eating,  normal RUQ US recently, eval for mass, obstruction, perforation, etc    FINDINGS:     LINES/TUBES/DEVICES: None.       LOWER CHEST: Within normal limits.     ABDOMEN/PELVIS: Mildly prominent fluid distending the duodenum. No  dilated bowel loops. No abnormal bowel wall enhancement. No suspicious  bowel wall thickening. No free fluid. Benign right renal cyst. No  suspicious renal lesions. No hydroureteronephrosis. No radiopaque  ureteral filling defect or periureteral fat stranding.    The stomach, liver, biliary system, pancreas, spleen, adrenal glands,  bladder, reproductive organs, major vascular structures, and lymph  nodes are within normal limits.     BONES, BODY WALL AND SOFT TISSUES: No suspicious lesions.       Impression    IMPRESSION:  No CT findings of bowel obstruction, abnormal mass,  intestinal perforation or nephroureterolithiasis.     I have personally reviewed the examination and initial interpretation  and I agree with the findings.    TAMARA TORRES MD         SYSTEM ID:  [id number]   Basic metabolic panel     Status: Abnormal   Result Value Ref Range    Sodium 141 133 - 144 mmol/L    Potassium 4.0 3.4 - 5.3 mmol/L    Chloride 110 (H) 94 - 109 mmol/L    Carbon Dioxide (CO2) 28 20 - 32 mmol/L    Anion Gap 3 3 - 14 mmol/L    Urea Nitrogen 19 7 - 30 mg/dL    Creatinine 0.77 0.52 - 1.25 mg/dL    Calcium 8.9 8.5 - 10.1 mg/dL    Glucose 133 (H) 70 - 99 mg/dL    GFR Estimate >90 >60 mL/min/1.73m2    Narrative    The sex of this patient cannot be reliably determined based on discrepancies in demographics (legal sex, sex assigned at birth, gender identity).  Both male and female  reference ranges are provided where applicable.  Careful evaluation of the patient s results as compared to the gender specific reference intervals is required in this setting.    Extra Blue Top Tube     Status: None   Result Value Ref Range    Hold Specimen JIC    Extra Red Top Tube     Status: None   Result Value Ref Range    Hold Specimen JIC    Extra Green Top (Lithium Heparin) Tube     Status: None   Result Value Ref Range    Hold Specimen JIC    Extra Purple Top Tube     Status: None   Result Value Ref Range    Hold Specimen JIC    CBC with platelets and differential     Status: Abnormal   Result Value Ref Range    WBC Count 10.0 4.0 - 11.0 10e3/uL    RBC Count 5.12 3.80 - 5.90 10e6/uL    Hemoglobin 16.0 11.7 - 17.7 g/dL    Hematocrit 47.1 35.0 - 53.0 %    MCV 92 78 - 100 fL    MCH 31.3 26.5 - 33.0 pg    MCHC 34.0 31.5 - 36.5 g/dL    RDW 13.0 10.0 - 15.0 %    Platelet Count 108 (L) 150 - 450 10e3/uL    % Neutrophils 84 %    % Lymphocytes 11 %    % Monocytes 4 %    % Eosinophils 1 %    % Basophils 0 %    % Immature Granulocytes 0 %    NRBCs per 100 WBC 0 <1 /100    Absolute Neutrophils 8.3 1.6 - 8.3 10e3/uL    Absolute Lymphocytes 1.1 0.8 - 5.3 10e3/uL    Absolute Monocytes 0.4 0.0 - 1.3 10e3/uL    Absolute Eosinophils 0.1 0.0 - 0.7 10e3/uL    Absolute Basophils 0.0 0.0 - 0.2 10e3/uL    Absolute Immature Granulocytes 0.0 <=0.4 10e3/uL    Absolute NRBCs 0.0 10e3/uL    Narrative    The sex of this patient cannot be reliably determined based on discrepancies in demographics (legal sex, sex assigned at birth, gender identity).  Both male and female reference ranges are provided where applicable.  Careful evaluation of the patient s results as compared to the gender specific reference intervals is required in this setting.    Hepatic panel     Status: Normal   Result Value Ref Range    Bilirubin Total 0.4 0.2 - 1.3 mg/dL    Bilirubin Direct 0.1 0.0 - 0.2 mg/dL    Protein Total 7.7 6.8 - 8.8 g/dL    Albumin 4.3 3.4 -  5.0 g/dL    Alkaline Phosphatase 56 40 - 150 U/L    AST 18 0 - 45 U/L    ALT 33 0 - 70 U/L    Narrative    The sex of this patient cannot be reliably determined based on discrepancies in demographics (legal sex, sex assigned at birth, gender identity).  Both male and female reference ranges are provided where applicable.  Careful evaluation of the patient s results as compared to the gender specific reference intervals is required in this setting.    Lipase     Status: Normal   Result Value Ref Range    Lipase 148 73 - 393 U/L   Lactic acid whole blood     Status: Normal   Result Value Ref Range    Lactic Acid 0.9 0.7 - 2.0 mmol/L   UA with Microscopic reflex to Culture     Status: Abnormal    Specimen: Urine, Midstream   Result Value Ref Range    Color Urine Light Yellow Colorless, Straw, Light Yellow, Yellow    Appearance Urine Clear Clear    Glucose Urine Negative Negative mg/dL    Bilirubin Urine Negative Negative    Ketones Urine 10  (A) Negative mg/dL    Specific Gravity Urine 1.005 1.003 - 1.035    Blood Urine Negative Negative    pH Urine 7.5 (H) 5.0 - 7.0    Protein Albumin Urine 10  (A) Negative mg/dL    Urobilinogen Urine Normal Normal, 2.0 mg/dL    Nitrite Urine Negative Negative    Leukocyte Esterase Urine Negative Negative    Mucus Urine Present (A) None Seen /LPF    RBC Urine 1 <=2 /HPF    WBC Urine <1 <=5 /HPF    Squamous Epithelials Urine 1 <=1 /HPF    Narrative    Urine Culture not indicated   Troponin I     Status: Normal   Result Value Ref Range    Troponin I High Sensitivity 6 <54 ng/L    Narrative    The sex of this patient cannot be reliably determined based on discrepancies in demographics (legal sex, sex assigned at birth, gender identity).  Both male and female reference ranges are provided where applicable.  Careful evaluation of the patient s results as compared to the gender specific reference intervals is required in this setting.    EKG 12-lead, tracing only     Status: None   Result  Value Ref Range    Systolic Blood Pressure  mmHg    Diastolic Blood Pressure  mmHg    Ventricular Rate 66 BPM    Atrial Rate 66 BPM    PA Interval 154 ms    QRS Duration 80 ms     ms    QTc 413 ms    P Axis 67 degrees    R AXIS 82 degrees    T Axis 40 degrees    Interpretation ECG       Sinus rhythm  Normal ECG  Unconfirmed report - interpretation of this ECG is computer generated - see medical record for final interpretation  Confirmed by - EMERGENCY ROOM, PHYSICIAN ([id number]),  PAMELA WEBB ([id number]) on 2/8/2022 2:49:58 PM     [place] Draw     Status: None    Narrative    The following orders were created for panel order [place] Draw.  Procedure                               Abnormality         Status                     ---------                               -----------         ------                     Extra Blue Top Tube[[id number]]                              Final result               Extra Red Top Tube[[id number]]                               Final result               Extra Green Top (Lithium...[[id number]]                      Final result               Extra Purple Top Tube[[id number]]                            Final result                 Please view results for these tests on the individual orders.   CBC with platelets differential     Status: Abnormal    Narrative    The following orders were created for panel order CBC with platelets differential.  Procedure                               Abnormality         Status                     ---------                               -----------         ------                     CBC with platelets and d...[[id number]]  Abnormal            Final result                 Please view results for these tests on the individual orders.     Medications   0.9% sodium chloride BOLUS (1,000 mLs Intravenous New Bag 2/8/22 0948)     Followed by   sodium chloride 0.9% infusion (has no administration in time range)   lidocaine (viscous) (XYLOCAINE) 2 % 15 mL, alum &  mag hydroxide-simethicone (MAALOX) 15 mL GI Cocktail (30 mLs Oral Given 2/8/22 0948)   ondansetron (ZOFRAN) injection 4 mg (4 mg Intravenous Given 2/8/22 0948)        Assessments & Plan (with Medical Decision Making)   The patient is currently stable. He has had chronic abdominal pain, nausea, and vomiting that has resulted and several evaluations and work up by gastroenterology. The patient's symptoms worsened this morning while at work after drinking muscle milk. Differential diagnosis includes but is not limited to gastritis, ulcer (EGD recently showed duodenitis with possible celiac disease), less likely myocardial infarction, abdominal aortic aneurysm, mesenteric ischemia, or underlying malignancy (stomach cancer vs cervical cancer mets vs pancreatic cancer), less likely gallbladder pathology with recent negative RUQ US, pancreatitis, dyspepsia. EKG was obtained and did not reveal any evidence of acute ischemia and troponin negative making ACS less likely. Since patient is a middle-aged female-to-male transgender with hx of hyperlipidemia, and previous female anatomy, gallstone ileus, cholecystitis, and pancreatitis should also be considered. Patient's recent abdominal U/S did not show evidence of gallstones and his LFTs are within normal limits making gallbladder pathology less likely. His epigastric pain does not radiate to his back and his lipase is normal making pancreatitis unlikely. He is afebrile with normal WBCs. Celiac's disease and ulcerative colitis should also be considered. Patient is due for a colonoscopy. EGD showed duodenitis and possible celiac disease but no bleeding ulcer. He denies rectal pain, hematochezia, melena, or constipation. No signs of GI bleed here.  Patient does not have a history of GERD but he says his epigastric pain sometimes worsens within minutes of eating food. The patient had COVID-19 two weeks ago. Interestingly, there is growing literature describing an association  between previous COVID-19 and post-infection functional GI disorders. CT obtained which was without acute pathology. Normal lactic acid and reassuring CT makes mesenteric ischemia unlikely. UA unremarkable. Patient given GI cocktail, zofran, IV fluids, pepcid IV and a single dose of morphine. He is improved on re-evaluation. Do suspect this may be a component of gastritis and possibly celiac disease with ongoing GI work up. Discussed close follow up with his PCP and GI team. He was given prescriptions for pepcid and omeprazole and instructed on use. He was given strict return precautions. He voiced understanding and was in agreement with this plan. He was discharged in improved condition.     I have reviewed the nursing notes. I have reviewed the findings, diagnosis, plan and need for follow up with the patient.    Discharge Medication List as of 2/8/2022  2:24 PM      START taking these medications    Details   famotidine (PEPCID) 10 MG tablet Take 1 tablet (10 mg) by mouth 2 times daily as needed (epigastric pain,  heart burn), Disp-28 tablet, R-0, E-Prescribe      omeprazole (PRILOSEC) 20 MG DR capsule Take 2 capsules (40 mg) by mouth daily, Disp-60 capsule, R-0, E-Prescribe      ondansetron (ZOFRAN ODT) 4 MG ODT tab Take 1 tablet (4 mg) by mouth every 8 hours as needed for nausea or vomiting, Disp-9 tablet, R-0, E-Prescribe             Final diagnoses:   Epigastric pain   Acute gastritis without hemorrhage, unspecified gastritis type     Romeo Hernandez, MS4  [employer] medical student    --  Terri Baldwin MD  [facility] EMERGENCY DEPARTMENT  2/8/2022    --    ED Attending Physician Attestation    I Terri Baldwin MD, cared for this patient with the Medical Student. I performed, or re-performed, the physical exam and medical decision-making. I have verified the accuracy of all the medical student findings and documentation above, and have edited as necessary. Additions made above.     Summary of  HPI, PE, ED Course   Patient is a 51 year old adult evaluated in the emergency department for abdominal pain. Exam notable for mild epigastric tenderness without signs of peritonitis and normal vital signs. ED course notable as above. After the completion of care in the emergency department, the patient was discharged.    Critical Care & Procedures  Not applicable.    Medical Decision Making  The medical record was reviewed and interpreted.  Current labs reviewed and interpreted.  Current images reviewed and interpreted: as above.  Previous images reviewed and interpreted: as above.  EKG reviewed and interpreted: as above.  Managed outpatient prescription medications.   See MDM above edited by me.       Terri Baldwin MD  Emergency Medicine          Terri Baldwin MD  05/07/22 1015

## 2022-02-08 NOTE — ED NOTES
Bed: ED18  Expected date:   Expected time:   Means of arrival:   Comments:  [facility] [address]  51 M  Abd pain  YELLOW

## 2022-02-08 NOTE — DISCHARGE INSTRUCTIONS
Please make an appointment to follow up with your GI doctor and Your Primary Care Provider as soon as possible.    Take the omeprazole in the morning prior to eating every day. Can also take pepcid as need up to twice a day. Can also use maalox/tums.     Return to the ED if you develop severe pain, blood in the vomit, blood in the stool or black tarry stools, light headedness, repetitive vomiting, fever, chest pain, shortness of breath, or any new or worsening concerns.

## 2022-02-08 NOTE — LETTER
February 8, 2022      To Whom It May Concern:      Harley Isidro was seen in our Emergency Department today, 02/08/22.  I expect his condition to improve over the next 2 days.  He may return to work/school when improved.    Sincerely,        Terri Baldwin MD

## 2022-02-08 NOTE — ED TRIAGE NOTES
Sarah biggs with abdominal pain  Has been seen in clinic prior and they are thinking he may have celiac but no diagnoses yet.    At work today acute low abdominal pain started and called ems.  Vss. Denies blood in stool. Emesis at work but none en route. Dizzy when standing lately.     Ems concerned for gi bleed.

## 2022-02-10 ENCOUNTER — TRANSFERRED RECORDS (OUTPATIENT)
Dept: HEALTH INFORMATION MANAGEMENT | Facility: CLINIC | Age: 52
End: 2022-02-10

## 2022-05-11 ENCOUNTER — TRANSFERRED RECORDS (OUTPATIENT)
Dept: HEALTH INFORMATION MANAGEMENT | Facility: CLINIC | Age: 52
End: 2022-05-11

## 2022-08-09 ENCOUNTER — HOSPITAL ENCOUNTER (EMERGENCY)
Facility: CLINIC | Age: 52
Discharge: HOME | End: 2022-08-09
Attending: EMERGENCY MEDICINE | Admitting: EMERGENCY MEDICINE
Payer: COMMERCIAL

## 2022-08-09 ENCOUNTER — APPOINTMENT (OUTPATIENT)
Dept: ULTRASOUND IMAGING | Facility: CLINIC | Age: 52
End: 2022-08-09
Attending: EMERGENCY MEDICINE
Payer: COMMERCIAL

## 2022-08-09 ENCOUNTER — TRANSCRIBE ORDERS (OUTPATIENT)
Dept: OTHER | Age: 52
End: 2022-08-09

## 2022-08-09 VITALS
HEART RATE: 51 BPM | DIASTOLIC BLOOD PRESSURE: 63 MMHG | WEIGHT: 141 LBS | OXYGEN SATURATION: 97 % | SYSTOLIC BLOOD PRESSURE: 109 MMHG | TEMPERATURE: 98.1 F | RESPIRATION RATE: 22 BRPM | BODY MASS INDEX: 24.07 KG/M2 | HEIGHT: 64 IN

## 2022-08-09 DIAGNOSIS — R10.13 ABDOMINAL PAIN, EPIGASTRIC: ICD-10-CM

## 2022-08-09 DIAGNOSIS — R10.9 CHRONIC ABDOMINAL PAIN: Primary | ICD-10-CM

## 2022-08-09 DIAGNOSIS — G89.29 CHRONIC ABDOMINAL PAIN: Primary | ICD-10-CM

## 2022-08-09 LAB
ALBUMIN SERPL-MCNC: 4.2 G/DL (ref 3.5–5)
ALP SERPL-CCNC: 59 U/L (ref 45–120)
ALT SERPL W P-5'-P-CCNC: 39 U/L (ref 0–45)
ANION GAP SERPL CALCULATED.3IONS-SCNC: 9 MMOL/L (ref 5–18)
AST SERPL W P-5'-P-CCNC: 24 U/L (ref 0–40)
BILIRUB SERPL-MCNC: 0.4 MG/DL (ref 0–1)
BUN SERPL-MCNC: 16 MG/DL (ref 8–22)
CALCIUM SERPL-MCNC: 9.6 MG/DL (ref 8.5–10.5)
CHLORIDE BLD-SCNC: 106 MMOL/L (ref 98–107)
CO2 SERPL-SCNC: 28 MMOL/L (ref 22–31)
CREAT SERPL-MCNC: 0.78 MG/DL (ref 0.6–1.3)
ERYTHROCYTE [DISTWIDTH] IN BLOOD BY AUTOMATED COUNT: 13.2 % (ref 10–15)
GFR SERPL CREATININE-BSD FRML MDRD: >90 ML/MIN/1.73M2
GLUCOSE BLD-MCNC: 83 MG/DL (ref 70–125)
HCT VFR BLD AUTO: 46.7 % (ref 35–53)
HGB BLD-MCNC: 16.1 G/DL (ref 11.7–17.7)
HOLD SPECIMEN: NORMAL
HOLD SPECIMEN: NORMAL
LIPASE SERPL-CCNC: 30 U/L (ref 0–52)
MCH RBC QN AUTO: 31.3 PG (ref 26.5–33)
MCHC RBC AUTO-ENTMCNC: 34.5 G/DL (ref 31.5–36.5)
MCV RBC AUTO: 91 FL (ref 78–100)
PLATELET # BLD AUTO: 114 10E3/UL (ref 150–450)
POTASSIUM BLD-SCNC: 4 MMOL/L (ref 3.5–5)
PROT SERPL-MCNC: 7 G/DL (ref 6–8)
RBC # BLD AUTO: 5.15 10E6/UL (ref 3.8–5.9)
SODIUM SERPL-SCNC: 143 MMOL/L (ref 136–145)
WBC # BLD AUTO: 6.3 10E3/UL (ref 4–11)

## 2022-08-09 PROCEDURE — 83690 ASSAY OF LIPASE: CPT | Performed by: EMERGENCY MEDICINE

## 2022-08-09 PROCEDURE — 250N000013 HC RX MED GY IP 250 OP 250 PS 637: Performed by: EMERGENCY MEDICINE

## 2022-08-09 PROCEDURE — 96374 THER/PROPH/DIAG INJ IV PUSH: CPT

## 2022-08-09 PROCEDURE — 96361 HYDRATE IV INFUSION ADD-ON: CPT

## 2022-08-09 PROCEDURE — 76705 ECHO EXAM OF ABDOMEN: CPT

## 2022-08-09 PROCEDURE — 250N000011 HC RX IP 250 OP 636: Performed by: EMERGENCY MEDICINE

## 2022-08-09 PROCEDURE — 258N000003 HC RX IP 258 OP 636: Performed by: EMERGENCY MEDICINE

## 2022-08-09 PROCEDURE — 36415 COLL VENOUS BLD VENIPUNCTURE: CPT | Performed by: EMERGENCY MEDICINE

## 2022-08-09 PROCEDURE — 85014 HEMATOCRIT: CPT | Performed by: EMERGENCY MEDICINE

## 2022-08-09 PROCEDURE — 99285 EMERGENCY DEPT VISIT HI MDM: CPT | Mod: 25

## 2022-08-09 PROCEDURE — 80053 COMPREHEN METABOLIC PANEL: CPT | Performed by: EMERGENCY MEDICINE

## 2022-08-09 RX ORDER — OXYCODONE HYDROCHLORIDE 5 MG/1
5 TABLET ORAL EVERY 6 HOURS PRN
Qty: 8 TABLET | Refills: 0 | Status: SHIPPED | OUTPATIENT
Start: 2022-08-09 | End: 2022-08-12

## 2022-08-09 RX ORDER — MORPHINE SULFATE 2 MG/ML
2 INJECTION, SOLUTION INTRAMUSCULAR; INTRAVENOUS ONCE
Status: COMPLETED | OUTPATIENT
Start: 2022-08-09 | End: 2022-08-09

## 2022-08-09 RX ORDER — SUCRALFATE 1 G/1
1 TABLET ORAL ONCE
Status: COMPLETED | OUTPATIENT
Start: 2022-08-09 | End: 2022-08-09

## 2022-08-09 RX ORDER — SUCRALFATE 1 G/1
1 TABLET ORAL 4 TIMES DAILY
Qty: 28 TABLET | Refills: 0 | Status: SHIPPED | OUTPATIENT
Start: 2022-08-09 | End: 2024-04-09 | Stop reason: ALTCHOICE

## 2022-08-09 RX ADMIN — MORPHINE SULFATE 2 MG: 2 INJECTION, SOLUTION INTRAMUSCULAR; INTRAVENOUS at 13:22

## 2022-08-09 RX ADMIN — SUCRALFATE 1 G: 1 TABLET ORAL at 14:15

## 2022-08-09 RX ADMIN — SODIUM CHLORIDE 500 ML: 9 INJECTION, SOLUTION INTRAVENOUS at 13:21

## 2022-08-09 ASSESSMENT — ENCOUNTER SYMPTOMS
FEVER: 0
CONSTIPATION: 1
BRUISES/BLEEDS EASILY: 0
ABDOMINAL PAIN: 1
SHORTNESS OF BREATH: 0
CONFUSION: 0
NAUSEA: 1
WOUND: 0
DIARRHEA: 1
TROUBLE SWALLOWING: 0
DIFFICULTY URINATING: 0
HEADACHES: 0
BACK PAIN: 0

## 2022-08-09 ASSESSMENT — ACTIVITIES OF DAILY LIVING (ADL): ADLS_ACUITY_SCORE: 35

## 2022-08-09 NOTE — Clinical Note
Harley Isidro was seen and treated in our emergency department on 8/9/2022.  He may return to work on 08/11/2022.       If you have any questions or concerns, please don't hesitate to call.      Nikko Huddleston MD

## 2022-08-09 NOTE — ED TRIAGE NOTES
History of celiac and has had a US and endoscopy 4 months ago which was pretty much normal. Pain started one week ago and was worse this am. Admits to eating rice last night. Seen at  and given a GI cocktail and zofran. Zofran did help with nausea. Pain localized to epigastric area. Only past surgeries was hysterectomy (transgender).      Triage Assessment     Row Name 08/09/22 1029       Triage Assessment (Adult)    Airway WDL WDL       Respiratory WDL    Respiratory WDL WDL       Skin Circulation/Temperature WDL    Skin Circulation/Temperature WDL WDL       Cardiac WDL    Cardiac WDL WDL       Peripheral/Neurovascular WDL    Peripheral Neurovascular WDL WDL       Cognitive/Neuro/Behavioral WDL    Cognitive/Neuro/Behavioral WDL WDL

## 2022-08-09 NOTE — DISCHARGE INSTRUCTIONS
As we discussed your pain is likely secondary to your known duodenitis.  Recommend continue medications as prescribed by GI.  Use Carafate 4 times a day as needed.  For breakthrough pain you can use oxycodone.  Follow-up with your primary care doctor for any refills.  Return to the ER for throwing up blood, fever, worsening pain or other concerns

## 2022-08-09 NOTE — ED PROVIDER NOTES
EMERGENCY DEPARTMENT ENCOUNTER      NAME: Harley Isidro  AGE: 52 year old adult  YOB: 1970  MRN: [id number]  EVALUATION DATE & TIME: 8/9/2022 12:08 PM    [facility]    ED PROVIDER: Oracio Santiago MD        Chief Complaint   Patient presents with     Abdominal Pain         FINAL IMPRESSION:  1. Abdominal pain, epigastric          ED COURSE & MEDICAL DECISION MAKING:    Pertinent Labs & Imaging studies reviewed. (See chart for details)  52 year old adult presents to the Emergency Department for evaluation of epigastric abdominal pain despite GI cocktail from urgent care    History of duodenitis found on endoscopy recently.  H. pylori negative.    Most likely epigastric abdominal pain secondary to gastritis.  With rectal quadrant tenderness also consider biliary colic, choledocholithiasis, pancreatitis.  No guarding or rigidity on abdominal examination    ACS unlikely.  AAA unlikely.    Plan for IV pain meds, labs, Carafate, ruq ultrasound    Pain improved.  Labs reviewed.  Ultrasound without evidence of gallstones    Plan for discharge home with recommendation continue antacids, start Carafate, start oxycodone for breakthrough pain, and follow-up with primary care doctor    At the conclusion of the encounter I discussed the results of all of the tests and the disposition. The questions were answered. The patient or family acknowledged understanding and was agreeable with the care plan.     ED Course as of 08/09/22 1456   Tue Aug 09, 2022   1456 Lipase: 30   1456 WBC: 6.3   1456 Bilirubin Total: 0.4   1456 Platelet Count(!): 114  Chronic     1:21 PM I met with patient for initial interview and encounter. PPE worn includes N95 mask and goggles.        MEDICATIONS GIVEN IN THE EMERGENCY:  Medications   sucralfate (CARAFATE) tablet 1 g (1 g Oral Given 8/9/22 1415)   morphine (PF) injection 2 mg (2 mg Intravenous Given 8/9/22 1322)   0.9% sodium chloride BOLUS (0 mLs  "Intravenous Stopped 8/9/22 1416)       NEW PRESCRIPTIONS STARTED AT TODAY'S ER VISIT  Discharge Medication List as of 8/9/2022  2:31 PM      START taking these medications    Details   oxyCODONE (ROXICODONE) 5 MG tablet Take 1 tablet (5 mg) by mouth every 6 hours as needed for pain, Disp-8 tablet, R-0, Local Print      sucralfate (CARAFATE) 1 GM tablet Take 1 tablet (1 g) by mouth 4 times daily, Disp-28 tablet, R-0, Local Print                =================================================================    HPI    Triage note  \"  History of celiac and has had a US and endoscopy 4 months ago which was pretty much normal. Pain started one week ago and was worse this am. Admits to eating rice last night. Seen at  and given a GI cocktail and zofran. Zofran did help with nausea. Pain localized to epigastric area. Only past surgeries was hysterectomy (transgender).      Triage Assessment     Row Name 08/09/22 1029       Triage Assessment (Adult)    Airway WDL WDL       Respiratory WDL    Respiratory WDL WDL       Skin Circulation/Temperature WDL    Skin Circulation/Temperature WDL WDL       Cardiac WDL    Cardiac WDL WDL       Peripheral/Neurovascular WDL    Peripheral Neurovascular WDL WDL       Cognitive/Neuro/Behavioral WDL    Cognitive/Neuro/Behavioral WDL WDL              \"      Patient information was obtained from: Patient     Use of : N/A       Harley ENNIS Sanna is a 52 year old adult with a pertinent history of depression who presents to this ED via walk-in for evaluation of abdominal pain.    Patient reports he has had ~1 week of intermittent epigastric abdominal pain. He also reports nausea and some intermittent constipation and diarrhea, states his stools \"are not constistent.\" He notes that he had an endoscopy for similar pain 4 months ago which showed some gastritis. He has since been on omeprazole and Pepcid. He was seen at  for his symptoms earlier today, was given a GI cocktail and Zofran, " and was referred to this ED after these did not improve his symptoms. Patient denies any other complaints.      REVIEW OF SYSTEMS   Review of Systems   Constitutional: Negative for fever.   HENT: Negative for trouble swallowing.    Respiratory: Negative for shortness of breath.    Cardiovascular: Negative for chest pain.   Gastrointestinal: Positive for abdominal pain (epigastric), constipation, diarrhea and nausea.   Genitourinary: Negative for difficulty urinating.   Musculoskeletal: Negative for back pain.   Skin: Negative for wound.   Allergic/Immunologic: Negative for immunocompromised state.   Neurological: Negative for headaches.   Hematological: Does not bruise/bleed easily.   Psychiatric/Behavioral: Negative for confusion.     PAST MEDICAL HISTORY:  History reviewed. No pertinent past medical history.    PAST SURGICAL HISTORY:  History reviewed. No pertinent surgical history.        CURRENT MEDICATIONS:    oxyCODONE (ROXICODONE) 5 MG tablet  sucralfate (CARAFATE) 1 GM tablet  escitalopram (LEXAPRO) 20 MG tablet  MELATONIN PO  oxyCODONE-acetaminophen (PERCOCET) 5-325 MG tablet  sertraline (ZOLOFT) 100 MG tablet  simvastatin (ZOCOR) 40 MG tablet  testosterone (ANDROGEL 1.62% PUMP) 20.25 MG/ACT gel  VITAMIN D, CHOLECALCIFEROL, PO  zinc gluconate 50 MG tablet        ALLERGIES:  Allergies   Allergen Reactions     Codeine Sulfate      Dilaudid Cough        FAMILY HISTORY:  No family history on file.    SOCIAL HISTORY:   Social History     Socioeconomic History     Marital status:    Tobacco Use     Smoking status: Former Smoker     Packs/day: 1.00     Types: Cigarettes     Quit date: 7/3/2012     Years since quitting: 10.1     Smokeless tobacco: Never Used   Substance and Sexual Activity     Alcohol use: Yes     Comment: social      Drug use: Never     Sexual activity: Yes     Partners: Female   Social History Narrative    9/15/2020: Ace began smoking when he was 16 years old with 1 ppd history. Has since  "quit smoking cigarettes. Ace is .       VITALS:  /63   Pulse 51   Temp 98.1  F (36.7  C) (Oral)   Resp 22   Ht 1.626 m (5' 4\")   Wt 64 kg (141 lb)   SpO2 97%   BMI 24.20 kg/m      PHYSICAL EXAM      Vitals: /63   Pulse 51   Temp 98.1  F (36.7  C) (Oral)   Resp 22   Ht 1.626 m (5' 4\")   Wt 64 kg (141 lb)   SpO2 97%   BMI 24.20 kg/m    General: Appears in no acute distress, awake, alert, interactive.  Eyes: Conjunctivae non-injected. Sclera anicteric.  HENT: Atraumatic.  Neck: Supple.  Respiratory/Chest: Respiration unlabored.  Abdomen: RUQ and epigastric tenderness, non distended  Musculoskeletal: Normal extremities. No edema or erythema.  Skin: Normal color. No rash or diaphoresis.  Neurologic: Face symmetric, moves all extremities spontaneously. Speech clear.  Psychiatric: Oriented to person, place, and time. Affect appropriate.       LAB:  All pertinent labs reviewed and interpreted.  Results for orders placed or performed during the hospital encounter of 08/09/22   Abdomen US, limited (RUQ only)    Impression    IMPRESSION:  1.  Normal limited abdominal ultrasound.  2.  Significant amount of bowel gas.       Comprehensive metabolic panel   Result Value Ref Range    Sodium 143 136 - 145 mmol/L    Potassium 4.0 3.5 - 5.0 mmol/L    Chloride 106 98 - 107 mmol/L    Carbon Dioxide (CO2) 28 22 - 31 mmol/L    Anion Gap 9 5 - 18 mmol/L    Urea Nitrogen 16 8 - 22 mg/dL    Creatinine 0.78 0.60 - 1.30 mg/dL    Calcium 9.6 8.5 - 10.5 mg/dL    Glucose 83 70 - 125 mg/dL    Alkaline Phosphatase 59 45 - 120 U/L    AST 24 0 - 40 U/L    ALT 39 0 - 45 U/L    Protein Total 7.0 6.0 - 8.0 g/dL    Albumin 4.2 3.5 - 5.0 g/dL    Bilirubin Total 0.4 0.0 - 1.0 mg/dL    GFR Estimate >90 >60 mL/min/1.73m2   CBC (+ platelets, no diff)   Result Value Ref Range    WBC Count 6.3 4.0 - 11.0 10e3/uL    RBC Count 5.15 3.80 - 5.90 10e6/uL    Hemoglobin 16.1 11.7 - 17.7 g/dL    Hematocrit 46.7 35.0 - 53.0 %    MCV 91 " 78 - 100 fL    MCH 31.3 26.5 - 33.0 pg    MCHC 34.5 31.5 - 36.5 g/dL    RDW 13.2 10.0 - 15.0 %    Platelet Count 114 (L) 150 - 450 10e3/uL   Result Value Ref Range    Lipase 30 0 - 52 U/L   Extra Blue Top Tube   Result Value Ref Range    Hold Specimen JIC    Extra Red Top Tube   Result Value Ref Range    Hold Specimen JIC        RADIOLOGY:  Reviewed all pertinent imaging. Please see official radiology report.  Abdomen US, limited (RUQ only)   Final Result   IMPRESSION:   1.  Normal limited abdominal ultrasound.   2.  Significant amount of bowel gas.                  PROCEDURES:   none      I, Beck Mir, am serving as a scribe to document services personally performed by Dilan Santiago MD based on my observation and the provider's statements to me. I, Dr. Dilan Santiago, attest that Beck Mir is acting in a scribe capacity, has observed my performance of the services and has documented them in accordance with my direction.    Dilan Santiago MD  Emergency Medicine  [facility] EMERGENCY ROOM  [facility] [facility] [id number] [phone]     Dilan Santiago MD  08/09/22 9520

## 2022-08-09 NOTE — ED NOTES
Introduced self to patient.  Whiteboard updated.  Plan of care and length of time discussed with patient.  Will continue to monitor. Josephine Jaeger RN.......8/9/2022 1:15 PM

## 2022-08-17 ENCOUNTER — DOCUMENTATION ONLY (OUTPATIENT)
Dept: GASTROENTEROLOGY | Facility: CLINIC | Age: 52
End: 2022-08-17

## 2022-08-17 NOTE — PROGRESS NOTES
Action 8/17/2022 9:33am -Bhao    Action Taken Fax request sent to [facility] ([phone]) for med recs.    ** Endoscopy requested by Marianna Singleton RN    1:53pm Received recs from [facility]; sent to scan. A copy was forward to Marianna.

## 2023-12-11 ENCOUNTER — APPOINTMENT (OUTPATIENT)
Dept: CT IMAGING | Facility: HOSPITAL | Age: 53
End: 2023-12-11
Attending: EMERGENCY MEDICINE

## 2023-12-11 ENCOUNTER — HOSPITAL ENCOUNTER (EMERGENCY)
Facility: HOSPITAL | Age: 53
Discharge: HOME | End: 2023-12-11
Attending: EMERGENCY MEDICINE | Admitting: EMERGENCY MEDICINE

## 2023-12-11 VITALS
OXYGEN SATURATION: 98 % | HEART RATE: 56 BPM | SYSTOLIC BLOOD PRESSURE: 110 MMHG | RESPIRATION RATE: 18 BRPM | DIASTOLIC BLOOD PRESSURE: 63 MMHG | BODY MASS INDEX: 24.75 KG/M2 | HEIGHT: 64 IN | WEIGHT: 145 LBS | TEMPERATURE: 98.6 F

## 2023-12-11 DIAGNOSIS — S06.0X0A CONCUSSION WITHOUT LOSS OF CONSCIOUSNESS, INITIAL ENCOUNTER: ICD-10-CM

## 2023-12-11 DIAGNOSIS — M43.6 NECK STIFFNESS: ICD-10-CM

## 2023-12-11 PROCEDURE — 99284 EMERGENCY DEPT VISIT MOD MDM: CPT | Mod: 25

## 2023-12-11 PROCEDURE — 250N000013 HC RX MED GY IP 250 OP 250 PS 637: Performed by: EMERGENCY MEDICINE

## 2023-12-11 PROCEDURE — 72125 CT NECK SPINE W/O DYE: CPT

## 2023-12-11 PROCEDURE — 70450 CT HEAD/BRAIN W/O DYE: CPT

## 2023-12-11 RX ORDER — CYCLOBENZAPRINE HCL 10 MG
10 TABLET ORAL ONCE
Status: COMPLETED | OUTPATIENT
Start: 2023-12-11 | End: 2023-12-11

## 2023-12-11 RX ORDER — ONDANSETRON 4 MG/1
4 TABLET, ORALLY DISINTEGRATING ORAL EVERY 8 HOURS PRN
Qty: 10 TABLET | Refills: 0 | Status: SHIPPED | OUTPATIENT
Start: 2023-12-11 | End: 2023-12-14

## 2023-12-11 RX ORDER — ACETAMINOPHEN 325 MG/1
650 TABLET ORAL ONCE
Status: COMPLETED | OUTPATIENT
Start: 2023-12-11 | End: 2023-12-11

## 2023-12-11 RX ORDER — CYCLOBENZAPRINE HCL 10 MG
10 TABLET ORAL 3 TIMES DAILY PRN
Qty: 20 TABLET | Refills: 0 | Status: SHIPPED | OUTPATIENT
Start: 2023-12-11 | End: 2023-12-17

## 2023-12-11 RX ADMIN — CYCLOBENZAPRINE HYDROCHLORIDE 10 MG: 10 TABLET, FILM COATED ORAL at 09:38

## 2023-12-11 RX ADMIN — ACETAMINOPHEN 650 MG: 325 TABLET ORAL at 09:38

## 2023-12-11 ASSESSMENT — ENCOUNTER SYMPTOMS
NECK PAIN: 1
HEADACHES: 1

## 2023-12-11 ASSESSMENT — ACTIVITIES OF DAILY LIVING (ADL): ADLS_ACUITY_SCORE: 35
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
## 2023-12-11 NOTE — DISCHARGE INSTRUCTIONS
Recommend Flexeril 3 times daily as needed for neck stiffness.  Use Zofran 3 times daily as needed for nausea.  You been referred to the concussion clinic they will contact you to help arrange follow-up.  If your neck stiffness is not improving recheck with primary care doctor for exenteration and physical therapy.  Recommend no work for 48 hours.

## 2023-12-11 NOTE — Clinical Note
Harley Isidro was seen and treated in our emergency department on 12/11/2023.  He may return to work on 12/13/2023.       If you have any questions or concerns, please don't hesitate to call.      Dilan Santiago MD

## 2024-04-08 ENCOUNTER — APPOINTMENT (OUTPATIENT)
Dept: RADIOLOGY | Facility: CLINIC | Age: 54
End: 2024-04-08
Attending: EMERGENCY MEDICINE
Payer: COMMERCIAL

## 2024-04-08 ENCOUNTER — HOSPITAL ENCOUNTER (EMERGENCY)
Facility: CLINIC | Age: 54
Discharge: HOME | End: 2024-04-08
Attending: EMERGENCY MEDICINE | Admitting: EMERGENCY MEDICINE
Payer: COMMERCIAL

## 2024-04-08 VITALS
RESPIRATION RATE: 18 BRPM | BODY MASS INDEX: 23.22 KG/M2 | SYSTOLIC BLOOD PRESSURE: 143 MMHG | TEMPERATURE: 97.9 F | DIASTOLIC BLOOD PRESSURE: 60 MMHG | HEART RATE: 48 BPM | OXYGEN SATURATION: 96 % | HEIGHT: 64 IN | WEIGHT: 136 LBS

## 2024-04-08 DIAGNOSIS — R07.89 ATYPICAL CHEST PAIN: ICD-10-CM

## 2024-04-08 LAB
ANION GAP SERPL CALCULATED.3IONS-SCNC: 8 MMOL/L (ref 7–15)
ATRIAL RATE - MUSE: 59 BPM
BASOPHILS # BLD AUTO: 0 10E3/UL (ref 0–0.2)
BASOPHILS NFR BLD AUTO: 0 %
BUN SERPL-MCNC: 27.8 MG/DL (ref 6–20)
CALCIUM SERPL-MCNC: 9.3 MG/DL (ref 8.6–10)
CHLORIDE SERPL-SCNC: 107 MMOL/L (ref 98–107)
CREAT SERPL-MCNC: 0.67 MG/DL (ref 0.51–1.17)
DEPRECATED HCO3 PLAS-SCNC: 28 MMOL/L (ref 22–29)
DIASTOLIC BLOOD PRESSURE - MUSE: 92 MMHG
EGFRCR SERPLBLD CKD-EPI 2021: >90 ML/MIN/1.73M2
EOSINOPHIL # BLD AUTO: 0.1 10E3/UL (ref 0–0.7)
EOSINOPHIL NFR BLD AUTO: 1 %
ERYTHROCYTE [DISTWIDTH] IN BLOOD BY AUTOMATED COUNT: 12.8 % (ref 10–15)
GLUCOSE SERPL-MCNC: 115 MG/DL (ref 70–99)
HCT VFR BLD AUTO: 44.1 % (ref 35–53)
HGB BLD-MCNC: 15.2 G/DL (ref 11.7–17.7)
IMM GRANULOCYTES # BLD: 0 10E3/UL
IMM GRANULOCYTES NFR BLD: 0 %
INTERPRETATION ECG - MUSE: NORMAL
LYMPHOCYTES # BLD AUTO: 1.5 10E3/UL (ref 0.8–5.3)
LYMPHOCYTES NFR BLD AUTO: 24 %
MCH RBC QN AUTO: 31.3 PG (ref 26.5–33)
MCHC RBC AUTO-ENTMCNC: 34.5 G/DL (ref 31.5–36.5)
MCV RBC AUTO: 91 FL (ref 78–100)
MONOCYTES # BLD AUTO: 0.3 10E3/UL (ref 0–1.3)
MONOCYTES NFR BLD AUTO: 5 %
NEUTROPHILS # BLD AUTO: 4.3 10E3/UL (ref 1.6–8.3)
NEUTROPHILS NFR BLD AUTO: 69 %
NRBC # BLD AUTO: 0 10E3/UL
NRBC BLD AUTO-RTO: 0 /100
NT-PROBNP SERPL-MCNC: 116 PG/ML (ref 0–900)
P AXIS - MUSE: 68 DEGREES
PLATELET # BLD AUTO: 120 10E3/UL (ref 150–450)
POTASSIUM SERPL-SCNC: 3.9 MMOL/L (ref 3.4–5.3)
PR INTERVAL - MUSE: 152 MS
QRS DURATION - MUSE: 84 MS
QT - MUSE: 404 MS
QTC - MUSE: 399 MS
R AXIS - MUSE: 72 DEGREES
RBC # BLD AUTO: 4.85 10E6/UL (ref 3.8–5.9)
SODIUM SERPL-SCNC: 143 MMOL/L (ref 135–145)
SYSTOLIC BLOOD PRESSURE - MUSE: 135 MMHG
T AXIS - MUSE: 56 DEGREES
TROPONIN T SERPL HS-MCNC: 8 NG/L
VENTRICULAR RATE- MUSE: 59 BPM
WBC # BLD AUTO: 6.2 10E3/UL (ref 4–11)

## 2024-04-08 PROCEDURE — 96374 THER/PROPH/DIAG INJ IV PUSH: CPT

## 2024-04-08 PROCEDURE — 250N000013 HC RX MED GY IP 250 OP 250 PS 637: Performed by: EMERGENCY MEDICINE

## 2024-04-08 PROCEDURE — 99285 EMERGENCY DEPT VISIT HI MDM: CPT | Mod: 25

## 2024-04-08 PROCEDURE — 84484 ASSAY OF TROPONIN QUANT: CPT | Performed by: EMERGENCY MEDICINE

## 2024-04-08 PROCEDURE — 80048 BASIC METABOLIC PNL TOTAL CA: CPT | Performed by: EMERGENCY MEDICINE

## 2024-04-08 PROCEDURE — 250N000011 HC RX IP 250 OP 636: Performed by: EMERGENCY MEDICINE

## 2024-04-08 PROCEDURE — 83880 ASSAY OF NATRIURETIC PEPTIDE: CPT | Performed by: EMERGENCY MEDICINE

## 2024-04-08 PROCEDURE — 96375 TX/PRO/DX INJ NEW DRUG ADDON: CPT

## 2024-04-08 PROCEDURE — 85025 COMPLETE CBC W/AUTO DIFF WBC: CPT | Performed by: EMERGENCY MEDICINE

## 2024-04-08 PROCEDURE — 93005 ELECTROCARDIOGRAM TRACING: CPT | Performed by: EMERGENCY MEDICINE

## 2024-04-08 PROCEDURE — 71045 X-RAY EXAM CHEST 1 VIEW: CPT

## 2024-04-08 PROCEDURE — 36415 COLL VENOUS BLD VENIPUNCTURE: CPT | Performed by: EMERGENCY MEDICINE

## 2024-04-08 RX ORDER — ONDANSETRON 2 MG/ML
4 INJECTION INTRAMUSCULAR; INTRAVENOUS ONCE
Status: COMPLETED | OUTPATIENT
Start: 2024-04-08 | End: 2024-04-08

## 2024-04-08 RX ORDER — HYDROCODONE BITARTRATE AND ACETAMINOPHEN 5; 325 MG/1; MG/1
1 TABLET ORAL ONCE
Status: COMPLETED | OUTPATIENT
Start: 2024-04-08 | End: 2024-04-08

## 2024-04-08 RX ORDER — HYDROCODONE BITARTRATE AND ACETAMINOPHEN 5; 325 MG/1; MG/1
1 TABLET ORAL EVERY 6 HOURS PRN
Qty: 5 TABLET | Refills: 0 | Status: SHIPPED | OUTPATIENT
Start: 2024-04-08 | End: 2024-04-09

## 2024-04-08 RX ORDER — KETOROLAC TROMETHAMINE 15 MG/ML
15 INJECTION, SOLUTION INTRAMUSCULAR; INTRAVENOUS ONCE
Status: COMPLETED | OUTPATIENT
Start: 2024-04-08 | End: 2024-04-08

## 2024-04-08 RX ADMIN — HYDROCODONE BITARTRATE AND ACETAMINOPHEN 1 TABLET: 5; 325 TABLET ORAL at 11:54

## 2024-04-08 RX ADMIN — KETOROLAC TROMETHAMINE 15 MG: 15 INJECTION, SOLUTION INTRAMUSCULAR; INTRAVENOUS at 10:38

## 2024-04-08 RX ADMIN — ONDANSETRON 4 MG: 2 INJECTION INTRAMUSCULAR; INTRAVENOUS at 10:36

## 2024-04-08 ASSESSMENT — ACTIVITIES OF DAILY LIVING (ADL)
ADLS_ACUITY_SCORE: 35
ADLS_ACUITY_SCORE: 36
ADLS_ACUITY_SCORE: 36
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
## 2024-04-08 NOTE — PROGRESS NOTES
Patient discharged home with AVS and script for pain medication. Will take as directed and follow up with rapid access heart clinic.

## 2024-04-08 NOTE — Clinical Note
Harley Isidro was seen and treated in our emergency department on 4/8/2024.  He may return to work on 04/10/2024.       If you have any questions or concerns, please don't hesitate to call.      Joni Tom, DO

## 2024-04-09 ENCOUNTER — OFFICE VISIT (OUTPATIENT)
Dept: CARDIOLOGY | Facility: CLINIC | Age: 54
End: 2024-04-09
Payer: COMMERCIAL

## 2024-04-09 VITALS
SYSTOLIC BLOOD PRESSURE: 92 MMHG | DIASTOLIC BLOOD PRESSURE: 62 MMHG | HEART RATE: 72 BPM | BODY MASS INDEX: 23.64 KG/M2 | HEIGHT: 64 IN | WEIGHT: 138.5 LBS

## 2024-04-09 DIAGNOSIS — R07.2 PRECORDIAL PAIN: Primary | ICD-10-CM

## 2024-04-09 DIAGNOSIS — R07.89 ATYPICAL CHEST PAIN: ICD-10-CM

## 2024-04-09 DIAGNOSIS — Z82.49 FAMILY HISTORY OF ISCHEMIC HEART DISEASE: ICD-10-CM

## 2024-04-09 PROCEDURE — 99204 OFFICE O/P NEW MOD 45 MIN: CPT | Performed by: INTERNAL MEDICINE

## 2024-04-09 RX ORDER — CYCLOBENZAPRINE HCL 5 MG
5 TABLET ORAL PRN
COMMUNITY
Start: 2024-03-23

## 2024-04-09 RX ORDER — ONDANSETRON 8 MG/1
8 TABLET, ORALLY DISINTEGRATING ORAL PRN
COMMUNITY
Start: 2023-06-08

## 2024-04-09 RX ORDER — TESTOSTERONE CYPIONATE 200 MG/ML
200 INJECTION, SOLUTION INTRAMUSCULAR WEEKLY
COMMUNITY
Start: 2023-06-12

## 2024-04-09 RX ORDER — ATORVASTATIN CALCIUM 40 MG/1
40 TABLET, FILM COATED ORAL DAILY
Status: CANCELLED | OUTPATIENT
Start: 2024-04-09

## 2024-04-09 RX ORDER — SUMATRIPTAN 50 MG/1
50 TABLET, FILM COATED ORAL
COMMUNITY

## 2024-04-09 RX ORDER — ASCORBIC ACID 1000 MG
1 TABLET ORAL DAILY
COMMUNITY

## 2024-04-09 RX ORDER — ROSUVASTATIN CALCIUM 10 MG/1
10 TABLET, COATED ORAL DAILY
Qty: 90 TABLET | Refills: 3 | Status: SHIPPED | OUTPATIENT
Start: 2024-04-09 | End: 2024-07-29

## 2024-04-09 RX ORDER — OMEPRAZOLE 40 MG/1
40 CAPSULE, DELAYED RELEASE ORAL DAILY
COMMUNITY

## 2024-04-09 RX ORDER — ATORVASTATIN CALCIUM 40 MG/1
40 TABLET, FILM COATED ORAL DAILY
COMMUNITY
Start: 2023-10-12
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
## 2024-05-09 ENCOUNTER — HOSPITAL ENCOUNTER (OUTPATIENT)
Dept: CT IMAGING | Facility: CLINIC | Age: 54
Discharge: HOME | End: 2024-05-09
Attending: INTERNAL MEDICINE | Admitting: INTERNAL MEDICINE
Payer: COMMERCIAL

## 2024-05-09 VITALS — DIASTOLIC BLOOD PRESSURE: 72 MMHG | SYSTOLIC BLOOD PRESSURE: 116 MMHG

## 2024-05-09 DIAGNOSIS — R07.2 PRECORDIAL PAIN: ICD-10-CM

## 2024-05-09 DIAGNOSIS — R07.89 ATYPICAL CHEST PAIN: ICD-10-CM

## 2024-05-09 DIAGNOSIS — Z82.49 FAMILY HISTORY OF ISCHEMIC HEART DISEASE: ICD-10-CM

## 2024-05-09 LAB
BSA FOR ECHO PROCEDURE: 0 M2
CREAT BLD-MCNC: 0.8 MG/DL (ref 0.6–1.3)
CV CALCIUM SCORE AGATSTON LM: 0
CV CALCIUM SCORING AGATSON LAD: 0
CV CALCIUM SCORING AGATSTON CX: 0
CV CALCIUM SCORING AGATSTON RCA: 0
CV CALCIUM SCORING AGATSTON TOTAL: 0
EGFRCR SERPLBLD CKD-EPI 2021: >60 ML/MIN/1.73M2

## 2024-05-09 PROCEDURE — 82565 ASSAY OF CREATININE: CPT

## 2024-05-09 PROCEDURE — 250N000011 HC RX IP 250 OP 636: Performed by: INTERNAL MEDICINE

## 2024-05-09 PROCEDURE — 250N000013 HC RX MED GY IP 250 OP 250 PS 637: Performed by: INTERNAL MEDICINE

## 2024-05-09 PROCEDURE — 75574 CT ANGIO HRT W/3D IMAGE: CPT | Mod: 26 | Performed by: INTERNAL MEDICINE

## 2024-05-09 PROCEDURE — 75574 CT ANGIO HRT W/3D IMAGE: CPT

## 2024-05-09 RX ORDER — METOPROLOL TARTRATE 1 MG/ML
5 INJECTION, SOLUTION INTRAVENOUS
Status: DISCONTINUED | OUTPATIENT
Start: 2024-05-09 | End: 2024-05-10 | Stop reason: HOSPADM

## 2024-05-09 RX ORDER — NITROGLYCERIN 0.4 MG/1
0.4 TABLET SUBLINGUAL ONCE
Status: COMPLETED | OUTPATIENT
Start: 2024-05-09 | End: 2024-05-09

## 2024-05-09 RX ORDER — DILTIAZEM HYDROCHLORIDE 5 MG/ML
5 INJECTION INTRAVENOUS
Status: DISCONTINUED | OUTPATIENT
Start: 2024-05-09 | End: 2024-05-10 | Stop reason: HOSPADM

## 2024-05-09 RX ORDER — DILTIAZEM HYDROCHLORIDE 5 MG/ML
10 INJECTION INTRAVENOUS
Status: DISCONTINUED | OUTPATIENT
Start: 2024-05-09 | End: 2024-05-10 | Stop reason: HOSPADM

## 2024-05-09 RX ORDER — LIDOCAINE 40 MG/G
CREAM TOPICAL
Status: DISCONTINUED | OUTPATIENT
Start: 2024-05-09 | End: 2024-05-10 | Stop reason: HOSPADM

## 2024-05-09 RX ORDER — IOPAMIDOL 755 MG/ML
100 INJECTION, SOLUTION INTRAVASCULAR ONCE
Status: COMPLETED | OUTPATIENT
Start: 2024-05-09 | End: 2024-05-09

## 2024-05-09 RX ADMIN — IOPAMIDOL 75 ML: 755 INJECTION, SOLUTION INTRAVENOUS at 07:40

## 2024-05-09 RX ADMIN — NITROGLYCERIN 0.4 MG: 0.4 TABLET SUBLINGUAL at 07:31

## 2024-06-12 ENCOUNTER — HOSPITAL ENCOUNTER (EMERGENCY)
Facility: CLINIC | Age: 54
Discharge: HOME | End: 2024-06-12
Attending: EMERGENCY MEDICINE | Admitting: EMERGENCY MEDICINE
Payer: COMMERCIAL

## 2024-06-12 ENCOUNTER — APPOINTMENT (OUTPATIENT)
Dept: CT IMAGING | Facility: CLINIC | Age: 54
End: 2024-06-12
Attending: EMERGENCY MEDICINE
Payer: COMMERCIAL

## 2024-06-12 VITALS
TEMPERATURE: 97.4 F | SYSTOLIC BLOOD PRESSURE: 128 MMHG | OXYGEN SATURATION: 96 % | BODY MASS INDEX: 23.22 KG/M2 | HEIGHT: 64 IN | RESPIRATION RATE: 18 BRPM | DIASTOLIC BLOOD PRESSURE: 71 MMHG | WEIGHT: 136 LBS | HEART RATE: 54 BPM

## 2024-06-12 DIAGNOSIS — R10.13 EPIGASTRIC PAIN: ICD-10-CM

## 2024-06-12 LAB
ALBUMIN SERPL BCG-MCNC: 4.2 G/DL (ref 3.5–5.2)
ALP SERPL-CCNC: 58 U/L (ref 40–150)
ALT SERPL W P-5'-P-CCNC: 26 U/L (ref 0–70)
ANION GAP SERPL CALCULATED.3IONS-SCNC: 8 MMOL/L (ref 7–15)
AST SERPL W P-5'-P-CCNC: 23 U/L (ref 0–45)
BASOPHILS # BLD AUTO: 0 10E3/UL (ref 0–0.2)
BASOPHILS NFR BLD AUTO: 1 %
BILIRUB SERPL-MCNC: 0.3 MG/DL
BUN SERPL-MCNC: 21.6 MG/DL (ref 6–20)
CALCIUM SERPL-MCNC: 8.9 MG/DL (ref 8.6–10)
CHLORIDE SERPL-SCNC: 106 MMOL/L (ref 98–107)
CREAT SERPL-MCNC: 0.69 MG/DL (ref 0.51–1.17)
DEPRECATED HCO3 PLAS-SCNC: 26 MMOL/L (ref 22–29)
EGFRCR SERPLBLD CKD-EPI 2021: >90 ML/MIN/1.73M2
EOSINOPHIL # BLD AUTO: 0.2 10E3/UL (ref 0–0.7)
EOSINOPHIL NFR BLD AUTO: 3 %
ERYTHROCYTE [DISTWIDTH] IN BLOOD BY AUTOMATED COUNT: 13 % (ref 10–15)
GLUCOSE SERPL-MCNC: 114 MG/DL (ref 70–99)
HCT VFR BLD AUTO: 43.1 % (ref 35–53)
HGB BLD-MCNC: 14.7 G/DL (ref 11.7–17.7)
IMM GRANULOCYTES # BLD: 0 10E3/UL
IMM GRANULOCYTES NFR BLD: 0 %
LIPASE SERPL-CCNC: 33 U/L (ref 13–60)
LYMPHOCYTES # BLD AUTO: 1.7 10E3/UL (ref 0.8–5.3)
LYMPHOCYTES NFR BLD AUTO: 27 %
MCH RBC QN AUTO: 31 PG (ref 26.5–33)
MCHC RBC AUTO-ENTMCNC: 34.1 G/DL (ref 31.5–36.5)
MCV RBC AUTO: 91 FL (ref 78–100)
MONOCYTES # BLD AUTO: 0.3 10E3/UL (ref 0–1.3)
MONOCYTES NFR BLD AUTO: 5 %
NEUTROPHILS # BLD AUTO: 4 10E3/UL (ref 1.6–8.3)
NEUTROPHILS NFR BLD AUTO: 64 %
NRBC # BLD AUTO: 0 10E3/UL
NRBC BLD AUTO-RTO: 0 /100
PLATELET # BLD AUTO: 109 10E3/UL (ref 150–450)
POTASSIUM SERPL-SCNC: 4 MMOL/L (ref 3.4–5.3)
PROT SERPL-MCNC: 6.5 G/DL (ref 6.4–8.3)
RBC # BLD AUTO: 4.74 10E6/UL (ref 3.8–5.9)
SODIUM SERPL-SCNC: 140 MMOL/L (ref 135–145)
WBC # BLD AUTO: 6.3 10E3/UL (ref 4–11)

## 2024-06-12 PROCEDURE — 250N000011 HC RX IP 250 OP 636: Mod: JZ | Performed by: EMERGENCY MEDICINE

## 2024-06-12 PROCEDURE — 96375 TX/PRO/DX INJ NEW DRUG ADDON: CPT

## 2024-06-12 PROCEDURE — 96374 THER/PROPH/DIAG INJ IV PUSH: CPT | Mod: 59

## 2024-06-12 PROCEDURE — 96361 HYDRATE IV INFUSION ADD-ON: CPT

## 2024-06-12 PROCEDURE — 36415 COLL VENOUS BLD VENIPUNCTURE: CPT | Performed by: EMERGENCY MEDICINE

## 2024-06-12 PROCEDURE — 83690 ASSAY OF LIPASE: CPT | Performed by: EMERGENCY MEDICINE

## 2024-06-12 PROCEDURE — 258N000003 HC RX IP 258 OP 636: Mod: JZ | Performed by: EMERGENCY MEDICINE

## 2024-06-12 PROCEDURE — 74177 CT ABD & PELVIS W/CONTRAST: CPT

## 2024-06-12 PROCEDURE — 85025 COMPLETE CBC W/AUTO DIFF WBC: CPT | Performed by: EMERGENCY MEDICINE

## 2024-06-12 PROCEDURE — 250N000013 HC RX MED GY IP 250 OP 250 PS 637: Performed by: EMERGENCY MEDICINE

## 2024-06-12 PROCEDURE — 96376 TX/PRO/DX INJ SAME DRUG ADON: CPT

## 2024-06-12 PROCEDURE — 80053 COMPREHEN METABOLIC PANEL: CPT | Performed by: EMERGENCY MEDICINE

## 2024-06-12 PROCEDURE — 99285 EMERGENCY DEPT VISIT HI MDM: CPT | Mod: 25

## 2024-06-12 RX ORDER — MORPHINE SULFATE 4 MG/ML
4 INJECTION, SOLUTION INTRAMUSCULAR; INTRAVENOUS ONCE
Status: COMPLETED | OUTPATIENT
Start: 2024-06-12 | End: 2024-06-12

## 2024-06-12 RX ORDER — MAGNESIUM HYDROXIDE/ALUMINUM HYDROXICE/SIMETHICONE 120; 1200; 1200 MG/30ML; MG/30ML; MG/30ML
15 SUSPENSION ORAL ONCE
Status: COMPLETED | OUTPATIENT
Start: 2024-06-12 | End: 2024-06-12

## 2024-06-12 RX ORDER — ONDANSETRON 2 MG/ML
8 INJECTION INTRAMUSCULAR; INTRAVENOUS ONCE
Status: COMPLETED | OUTPATIENT
Start: 2024-06-12 | End: 2024-06-12

## 2024-06-12 RX ORDER — SUCRALFATE ORAL 1 G/10ML
1 SUSPENSION ORAL 4 TIMES DAILY PRN
Qty: 414 ML | Refills: 0 | Status: SHIPPED | OUTPATIENT
Start: 2024-06-12

## 2024-06-12 RX ORDER — IOPAMIDOL 755 MG/ML
90 INJECTION, SOLUTION INTRAVASCULAR ONCE
Status: COMPLETED | OUTPATIENT
Start: 2024-06-12 | End: 2024-06-12

## 2024-06-12 RX ORDER — FAMOTIDINE 20 MG/1
20 TABLET, FILM COATED ORAL 2 TIMES DAILY
Qty: 60 TABLET | Refills: 0 | Status: SHIPPED | OUTPATIENT
Start: 2024-06-12

## 2024-06-12 RX ADMIN — MORPHINE SULFATE 4 MG: 4 INJECTION, SOLUTION INTRAMUSCULAR; INTRAVENOUS at 11:31

## 2024-06-12 RX ADMIN — FAMOTIDINE 20 MG: 10 INJECTION, SOLUTION INTRAVENOUS at 09:02

## 2024-06-12 RX ADMIN — IOPAMIDOL 90 ML: 755 INJECTION, SOLUTION INTRAVENOUS at 11:04

## 2024-06-12 RX ADMIN — MORPHINE SULFATE 4 MG: 4 INJECTION, SOLUTION INTRAMUSCULAR; INTRAVENOUS at 09:26

## 2024-06-12 RX ADMIN — ONDANSETRON 8 MG: 2 INJECTION INTRAMUSCULAR; INTRAVENOUS at 09:01

## 2024-06-12 RX ADMIN — SODIUM CHLORIDE 1000 ML: 9 INJECTION, SOLUTION INTRAVENOUS at 09:01

## 2024-06-12 RX ADMIN — ALUMINUM HYDROXIDE, MAGNESIUM HYDROXIDE, AND DIMETHICONE 15 ML: 200; 20; 200 SUSPENSION ORAL at 09:26

## 2024-06-12 ASSESSMENT — ACTIVITIES OF DAILY LIVING (ADL)
ADLS_ACUITY_SCORE: 35

## 2024-06-12 NOTE — Clinical Note
Harley Isidro was seen and treated in our emergency department on 6/12/2024.  He may return to work on 06/14/2024.       If you have any questions or concerns, please don't hesitate to call.      Rabia Hernandez MD

## 2024-06-12 NOTE — Clinical Note
Harley Isidro was seen and treated in our emergency department on 6/12/2024.  He may return to work on 06/14/2024.       If you have any questions or concerns, please don't hesitate to call.      Rabia Hernanedz MD

## 2024-06-12 NOTE — ED PROVIDER NOTES
EMERGENCY DEPARTMENT ENCOUNTER     NAME: Harley Isidro   AGE: 53 year old adult   YOB: 1970   MRN: [id number]   EVALUATION DATE & TIME: 6/12/2024  8:39 AM   PCP: Clinic, [facility]     Chief Complaint   Patient presents with    Abdominal Pain   :    FINAL IMPRESSION       1. Epigastric pain           ED COURSE & MEDICAL DECISION MAKING      Pertinent Labs & Imaging studies reviewed. (See chart for details)   53 year old adult  presents to the Emergency Department for evaluation of epigastric pain and vomiting. Initial Vitals Reviewed. Initial exam notable for generally well-appearing patient with mild epigastric pain and tenderness, no peritoneal signs.  For me, he has no tenderness in the right upper quadrant or right lower quadrant and I do not suspect appendicitis, cholecystitis.  I did consider GERD, gastritis, pancreatitis, gastroenteritis as most likely.  He did improve after Pepcid, morphine, and a GI cocktail but had some recurrence of pain so we ultimately decided to do a CT scan to rule out something like obstruction, colitis, pancreatitis not seen on lab studies as labs are reassuring.  Fortunately CT scan is negative.  Patient feels reassured and I will discharge with a prescription for Pepcid and Carafate and a note for work as per his request.           At the conclusion of the encounter I discussed the results of all of the tests and the disposition. The questions were answered. The patient or family acknowledged understanding and was agreeable with the care plan.     0 minutes critical care time, see procedure note below for details if relevant    Medical Decision Making    History:  Supplemental history from: Documented in chart  External Record(s) reviewed: Outpatient Record: Nurse triage note 6/12/2024    Work Up:  Chart documentation includes differential considered and any EKGs or imaging independently interpreted by provider, where specified.  In additional to  work up documented, I considered the following work up: Documented in chart, if applicable.    External consultation:  Discussion of management with another provider: Documented in chart, if applicable    Complicating factors:  Care impacted by chronic illness: Hyperlipidemia  Care affected by social determinants of health: Access to Medical Care    Disposition considerations: Discharge. I prescribed additional prescription strength medication(s) as charted. I considered admission, but ultimately discharged patient with reassuring workup and clinical improvement.        MEDICATIONS GIVEN IN THE EMERGENCY:   Medications   ondansetron (ZOFRAN) injection 8 mg (8 mg Intravenous $Given 6/12/24 0901)   sodium chloride 0.9% BOLUS 1,000 mL (1,000 mLs Intravenous $New Bag 6/12/24 0901)   famotidine (PEPCID) injection 20 mg (20 mg Intravenous $Given 6/12/24 0902)   morphine (PF) injection 4 mg (4 mg Intravenous $Given 6/12/24 0926)   alum & mag hydroxide-simethicone (MAALOX) suspension 15 mL (15 mLs Oral $Given 6/12/24 0926)      NEW PRESCRIPTIONS STARTED AT TODAY'S ER VISIT   New Prescriptions    No medications on file     ================================================================   HISTORY OF PRESENT ILLNESS       Patient information was obtained from: Patient  Use of Intrepreter:   Harley Isidro is a 53 year old adult with history of GERD who presents for evaluation of epigastric abdominal pain and vomiting.  Patient denies any fever, bloody stool, diarrhea, constipation, urinary symptoms.  He notes epigastric pain radiating into the left upper quadrant.    ================================================================        PAST HISTORY     PAST MEDICAL HISTORY:   No past medical history on file.   PAST SURGICAL HISTORY:   No past surgical history on file.   CURRENT MEDICATIONS:   atorvastatin (LIPITOR) 40 MG tablet  Coenzyme Q10 (CO Q 10) 10 MG CAPS  cyclobenzaprine (FLEXERIL) 5 MG tablet  escitalopram (LEXAPRO)  "20 MG tablet  omeprazole (PRILOSEC) 40 MG DR capsule  ondansetron (ZOFRAN ODT) 8 MG ODT tab  oxyCODONE-acetaminophen (PERCOCET) 5-325 MG tablet  rosuvastatin (CRESTOR) 10 MG tablet  SUMAtriptan (IMITREX) 50 MG tablet  testosterone cypionate (DEPOTESTOSTERONE) 200 MG/ML injection  VITAMIN D, CHOLECALCIFEROL, PO  zinc gluconate 50 MG tablet      ALLERGIES:   Allergies   Allergen Reactions    Caffeine Other (See Comments)     Makes him anxious and hyper     Makes him anxious and hyper    Codeine Sulfate     Dilaudid Cough     Varenicline Other (See Comments)     Caused extreme irritability      FAMILY HISTORY:   No family history on file.   SOCIAL HISTORY:   Social History     Socioeconomic History    Marital status:    Tobacco Use    Smoking status: Former     Current packs/day: 0.00     Types: Cigarettes     Quit date: 7/3/2012     Years since quittin.9    Smokeless tobacco: Never   Vaping Use    Vaping status: Never Used   Substance and Sexual Activity    Alcohol use: Yes     Comment: social     Drug use: Never    Sexual activity: Yes     Partners: Female   Social History Narrative    9/15/2020: Ace began smoking when he was 16 years old with 1 ppd history. Has since quit smoking cigarettes. Ace is .        VITALS  Patient Vitals for the past 24 hrs:   BP Temp Pulse Resp SpO2 Height Weight   24 0836 111/71 97.4  F (36.3  C) 90 18 96 % 1.626 m (5' 4\") 61.7 kg (136 lb)        ================================================================    PHYSICAL EXAM     VITAL SIGNS: /71   Pulse 90   Temp 97.4  F (36.3  C)   Resp 18   Ht 1.626 m (5' 4\")   Wt 61.7 kg (136 lb)   SpO2 96%   BMI 23.34 kg/m     Constitutional:  Awake, no acute distress   HENT:  Atraumatic, oropharynx without exudate or erythema, membranes moist  Lymph:  No adenopathy  Eyes: EOM intact, PERRL, no injection  Neck: Supple  Respiratory:  Clear to auscultation bilaterally, no wheezes or crackles   Cardiovascular:  " Regular rate and rhythm, single S1 and S2   GI:  Soft, epigastric tenderness, nondistended, no rebound or guarding   Musculoskeletal:  Moves all extremities, no lower extremity edema, no deformities    Skin:  Warm, dry  Neurologic:  Alert and oriented x3, no focal deficits noted       ================================================================  LAB       All pertinent labs reviewed and interpreted.   Labs Ordered and Resulted from Time of ED Arrival to Time of ED Departure   COMPREHENSIVE METABOLIC PANEL - Abnormal       Result Value    Sodium 140      Potassium 4.0      Carbon Dioxide (CO2) 26      Anion Gap 8      Urea Nitrogen 21.6 (*)     Creatinine 0.69      GFR Estimate >90      Calcium 8.9      Chloride 106      Glucose 114 (*)     Alkaline Phosphatase 58      AST 23      ALT 26      Protein Total 6.5      Albumin 4.2      Bilirubin Total 0.3     CBC WITH PLATELETS AND DIFFERENTIAL - Abnormal    WBC Count 6.3      RBC Count 4.74      Hemoglobin 14.7      Hematocrit 43.1      MCV 91      MCH 31.0      MCHC 34.1      RDW 13.0      Platelet Count 109 (*)     % Neutrophils 64      % Lymphocytes 27      % Monocytes 5      % Eosinophils 3      % Basophils 1      % Immature Granulocytes 0      NRBCs per 100 WBC 0      Absolute Neutrophils 4.0      Absolute Lymphocytes 1.7      Absolute Monocytes 0.3      Absolute Eosinophils 0.2      Absolute Basophils 0.0      Absolute Immature Granulocytes 0.0      Absolute NRBCs 0.0     LIPASE - Normal    Lipase 33          ===============================================================  RADIOLOGY       Reviewed all pertinent imaging. Please see official radiology report.   CT Abdomen Pelvis w Contrast   Final Result   IMPRESSION:    1.  No abnormalities are seen to explain symptoms.   2.  Specifically, no inflammatory changes or bowel obstruction. Appendix is normal.   3.  Punctate nonobstructing intrarenal calculus in the left kidney and incidental right renal cyst again  noted.            ================================================================  EKG         I have independently reviewed and interpreted the EKG(s) documented above.     ================================================================  PROCEDURES           Rabia Hernandez M.D.   Emergency Medicine   [facility] EMERGENCY ROOM  [address] [facility] [address]  [phone]  Dept: [phone]        Rabia Hernandez MD  06/12/24 115

## 2024-07-09 ENCOUNTER — LAB (OUTPATIENT)
Dept: CARDIOLOGY | Facility: CLINIC | Age: 54
End: 2024-07-09
Payer: COMMERCIAL

## 2024-07-09 DIAGNOSIS — R07.89 ATYPICAL CHEST PAIN: ICD-10-CM

## 2024-07-09 DIAGNOSIS — Z82.49 FAMILY HISTORY OF ISCHEMIC HEART DISEASE: ICD-10-CM

## 2024-07-09 DIAGNOSIS — R07.2 PRECORDIAL PAIN: ICD-10-CM

## 2024-07-09 LAB
ALBUMIN SERPL BCG-MCNC: 4.5 G/DL (ref 3.5–5.2)
ALP SERPL-CCNC: 65 U/L (ref 40–150)
ALT SERPL W P-5'-P-CCNC: 43 U/L (ref 0–70)
ANION GAP SERPL CALCULATED.3IONS-SCNC: 10 MMOL/L (ref 7–15)
AST SERPL W P-5'-P-CCNC: 36 U/L (ref 0–45)
BILIRUB SERPL-MCNC: 0.3 MG/DL
BUN SERPL-MCNC: 25.8 MG/DL (ref 6–20)
CALCIUM SERPL-MCNC: 9.5 MG/DL (ref 8.6–10)
CHLORIDE SERPL-SCNC: 104 MMOL/L (ref 98–107)
CHOLEST SERPL-MCNC: 218 MG/DL
CREAT SERPL-MCNC: 0.73 MG/DL (ref 0.51–1.17)
DEPRECATED HCO3 PLAS-SCNC: 29 MMOL/L (ref 22–29)
EGFRCR SERPLBLD CKD-EPI 2021: >90 ML/MIN/1.73M2
FASTING STATUS PATIENT QL REPORTED: YES
GLUCOSE SERPL-MCNC: 136 MG/DL (ref 70–99)
HDLC SERPL-MCNC: 64 MG/DL
LDLC SERPL CALC-MCNC: 140 MG/DL
NONHDLC SERPL-MCNC: 154 MG/DL
POTASSIUM SERPL-SCNC: 4.5 MMOL/L (ref 3.4–5.3)
PROT SERPL-MCNC: 7 G/DL (ref 6.4–8.3)
SODIUM SERPL-SCNC: 143 MMOL/L (ref 135–145)
TRIGL SERPL-MCNC: 71 MG/DL

## 2024-07-09 PROCEDURE — 80061 LIPID PANEL: CPT

## 2024-07-09 PROCEDURE — 80053 COMPREHEN METABOLIC PANEL: CPT

## 2024-07-09 PROCEDURE — 36415 COLL VENOUS BLD VENIPUNCTURE: CPT

## 2024-07-29 DIAGNOSIS — R07.2 PRECORDIAL PAIN: ICD-10-CM

## 2024-07-29 DIAGNOSIS — Z82.49 FAMILY HISTORY OF ISCHEMIC HEART DISEASE: ICD-10-CM

## 2024-07-29 DIAGNOSIS — R07.89 ATYPICAL CHEST PAIN: ICD-10-CM

## 2024-07-29 DIAGNOSIS — E78.5 HYPERLIPIDEMIA LDL GOAL <100: Primary | ICD-10-CM

## 2024-07-29 RX ORDER — ROSUVASTATIN CALCIUM 20 MG/1
20 TABLET, COATED ORAL DAILY
Status: SHIPPED
Start: 2024-07-29 | End: 2024-08-05

## 2024-08-05 ENCOUNTER — TELEPHONE (OUTPATIENT)
Dept: CARDIOLOGY | Facility: CLINIC | Age: 54
End: 2024-08-05
Payer: COMMERCIAL

## 2024-08-05 DIAGNOSIS — R07.89 ATYPICAL CHEST PAIN: ICD-10-CM

## 2024-08-05 DIAGNOSIS — R07.2 PRECORDIAL PAIN: ICD-10-CM

## 2024-08-05 DIAGNOSIS — Z82.49 FAMILY HISTORY OF ISCHEMIC HEART DISEASE: ICD-10-CM

## 2024-08-05 RX ORDER — ROSUVASTATIN CALCIUM 20 MG/1
20 TABLET, COATED ORAL DAILY
Qty: 90 TABLET | Refills: 2 | Status: SHIPPED | OUTPATIENT
Start: 2024-08-05 | End: 2024-08-07

## 2024-08-05 NOTE — TELEPHONE ENCOUNTER
[facility] Call Center    Phone Message    May a detailed message be left on voicemail: yes     Reason for Call: Medication Refill Request    Has the patient contacted the pharmacy for the refill? Yes   Name of medication being requested: rosuvastatin (CRESTOR) 20 MG tablet [[id number]] (Order [id number]) - Reflex for Order [id number]   Provider who prescribed the medication: Deng  Pharmacy:      [id number] [address] [place]  AT [place] [address]    Date medication is needed: pt has been out for 3 days     Sounds like refill ended up being messed up with the medication change for dosage.     Action Taken: Other: cardiology     Travel Screening: Not Applicable      Thank you!  [facility]

## 2024-08-07 RX ORDER — ROSUVASTATIN CALCIUM 20 MG/1
20 TABLET, COATED ORAL DAILY
Qty: 90 TABLET | Refills: 2 | Status: SHIPPED | OUTPATIENT
Start: 2024-08-07

## 2024-08-07 NOTE — TELEPHONE ENCOUNTER
[facility] Call Center    Phone Message    May a detailed message be left on voicemail: yes     Reason for Call: Other: Ace is calling to check on the status of his refill.  He has now been out for 5 days and is very concerned.  Please call him back to further discuss and provide an update.     Action Taken: Other: Cardiology    Travel Screening: Not Applicable    Thank you!  [facility]

## 2024-09-07 ENCOUNTER — HEALTH MAINTENANCE LETTER (OUTPATIENT)
Age: 54
End: 2024-09-07

## 2024-10-24 ENCOUNTER — HOSPITAL ENCOUNTER (EMERGENCY)
Facility: CLINIC | Age: 54
Discharge: HOME | End: 2024-10-24
Admitting: EMERGENCY MEDICINE
Payer: OTHER MISCELLANEOUS

## 2024-10-24 ENCOUNTER — APPOINTMENT (OUTPATIENT)
Dept: RADIOLOGY | Facility: CLINIC | Age: 54
End: 2024-10-24
Payer: OTHER MISCELLANEOUS

## 2024-10-24 VITALS
TEMPERATURE: 98.2 F | HEIGHT: 64 IN | DIASTOLIC BLOOD PRESSURE: 68 MMHG | BODY MASS INDEX: 24.75 KG/M2 | WEIGHT: 145 LBS | OXYGEN SATURATION: 98 % | SYSTOLIC BLOOD PRESSURE: 120 MMHG | HEART RATE: 57 BPM | RESPIRATION RATE: 20 BRPM

## 2024-10-24 DIAGNOSIS — M25.572 ACUTE LEFT ANKLE PAIN: ICD-10-CM

## 2024-10-24 PROCEDURE — 73610 X-RAY EXAM OF ANKLE: CPT | Mod: LT

## 2024-10-24 PROCEDURE — 250N000011 HC RX IP 250 OP 636

## 2024-10-24 PROCEDURE — 73630 X-RAY EXAM OF FOOT: CPT | Mod: LT

## 2024-10-24 PROCEDURE — 99284 EMERGENCY DEPT VISIT MOD MDM: CPT

## 2024-10-24 PROCEDURE — 96372 THER/PROPH/DIAG INJ SC/IM: CPT

## 2024-10-24 RX ORDER — KETOROLAC TROMETHAMINE 15 MG/ML
15 INJECTION, SOLUTION INTRAMUSCULAR; INTRAVENOUS ONCE
Status: COMPLETED | OUTPATIENT
Start: 2024-10-24 | End: 2024-10-24

## 2024-10-24 RX ADMIN — KETOROLAC TROMETHAMINE 15 MG: 15 INJECTION, SOLUTION INTRAMUSCULAR; INTRAVENOUS at 12:04

## 2024-10-24 ASSESSMENT — ACTIVITIES OF DAILY LIVING (ADL): ADLS_ACUITY_SCORE: 0

## 2024-10-24 ASSESSMENT — COLUMBIA-SUICIDE SEVERITY RATING SCALE - C-SSRS
6. HAVE YOU EVER DONE ANYTHING, STARTED TO DO ANYTHING, OR PREPARED TO DO ANYTHING TO END YOUR LIFE?: NO
1. IN THE PAST MONTH, HAVE YOU WISHED YOU WERE DEAD OR WISHED YOU COULD GO TO SLEEP AND NOT WAKE UP?: NO
2. HAVE YOU ACTUALLY HAD ANY THOUGHTS OF KILLING YOURSELF IN THE PAST MONTH?: NO

## 2024-10-24 NOTE — ED PROVIDER NOTES
EMERGENCY DEPARTMENT ENCOUNTER      NAME: Harley Isidro  AGE: 54 year old adult  YOB: 1970  MRN: [id number]  EVALUATION DATE & TIME: No admission date for patient encounter.    PCP: [facility]    ED PROVIDER: Lauren Bailey PA-C      CHIEF COMPLAINT:  Ankle Pain      FINAL IMPRESSION:  1. Acute left ankle pain          ED COURSE & MEDICAL DECISION MAKING:  Pertinent Labs & Imaging studies reviewed. (See chart for details)       The patient is a 54 year old-year-old adult presenting to the emergency department for evaluation of left ankle pain. Yesterday at 2 PM he was carrying a screening while working, some people around by in the hallway, causing him to drop the screen on his  left foot and ankle with resulting pain.  He has been able to weight-bear with onset of pain.  He has iced the ankle, rested, elevation, and take medication at home, last dose of Tylenol 5 AM this morning without relief.  No history of surgeries, numbness, weakness, endorses tingling described as pain.  He is established with [facility] for back pain.    Initial vital signs reviewed and all within normal limits.  On exam, patient is generally well-appearing nontoxic appearing in no acute distress and without signs of gross bony deformity or serious injury.  There is bony tenderness with palpation of posterior lateral mallolus and overlying ATFL and left lateral midfoot.  Strength 5/5 with plantarflexion and dorsiflexion, flexion and extension at the knee.  Swelling present corresponding with area pain over the ATFL, no increased warmth or overlying skin changes including no erythema.  Sensation grossly intact to light touch.  Distal extremity is warm with pulses intact.   No increased laxity at the ankle or knee.  No bony tenderness to the forefoot, tib-fib, or knee.  Compartments are soft.    Considered a broad differential including fracture, dislocation, ligamentous or tendinous  injury, soft tissue contusion. Given areas of tenderness on exam, patient does meet criteria for imaging per [place] [facility] rule. Patient has no other complaints of injuries or symptoms. Will pursue workup with XR of ankle and foot and manage discomfort with toradol.  Patient denies history of frequent alcohol use, frequent NSAID use, GI bleed, kidney problems. At this point, I see no indication for laboratory workup.     XR showed no evidence of fracture, dislocation, or other acute bony injury. At this point, history and exam is most consistent with ligamentous injury / ankle sprain. Will plan to place patient in a brace and discharge with crutches, recommendations for supportive care and a referral to sports medicine / ortho clinic for follow up.  Patient advised to use ibuprofen/APAP for pain, rest, ice, and elevate.     Patient is otherwise well appearing and without evidence of neurovascular injury or compartment syndrome, plan to discharge home with symptomatic care including rest, ice, elevation and OTC analgesics. Patient was provided with clear, written instructions of potential danger signs and where and when to return for emergency care or re-evaluation. [facility] contact information provided. The patient verbalized understanding and is comfortable with this plan.     At the conclusion of the encounter I discussed the results of all of the tests and the disposition. The questions were answered. The patient or family acknowledged understanding and was agreeable with the care plan.       ED COURSE:  11:21 AM I met and introduced myself to the patient. I gathered initial history and performed an initial physical exam. We discussed options and plan for diagnostics and treatment here in the emergency department. I discussed the plan for discharge with the patient, and patient is agreeable. We discussed supportive cares at home and reasons for return to the ER including new or worsening symptoms - all  questions and concerns addressed to the best of my ability. Strict return precautions discussed. Patient to be discharged by RN.        MEDICATIONS GIVEN IN THE EMERGENCY:  Medications   ketorolac (TORADOL) injection 15 mg (15 mg Intramuscular $Given 10/24/24 1204)       NEW PRESCRIPTIONS STARTED AT TODAY'S ER VISIT  Discharge Medication List as of 10/24/2024 11:49 AM             =================================================================    HPI    Patient information was obtained from: the patient     Use of Intrepreter: N/A         Ace D Sanna is a 54 year old adult who presents to the emergency department for evaluation of ankle pain.    The patient reports that he was carrying a screening while working at 2 pm yesterday when some people ran around nearby in the hallway, causing him to drop the screen on his  left foot and ankle with resulting pain.  He has been able to weight-bear with onset of pain.  He has iced the ankle, rested, elevation, and take medication at home, last dose of Tylenol 5 AM this morning without relief.  No history of surgeries, numbness, weakness. Endorses tingling described as feeling like pain.  He is established with [facility] for back pain.  Patient has no other complaints of injuries or symptoms.  Patient denies history of frequent alcohol use, frequent NSAID use, GI bleed, kidney problems.       PAST MEDICAL HISTORY:  No past medical history on file.    PAST SURGICAL HISTORY:  No past surgical history on file.    CURRENT MEDICATIONS:    Prior to Admission Medications   Prescriptions Last Dose Informant Patient Reported? Taking?   Coenzyme Q10 (CO Q 10) 10 MG CAPS   Yes No   Sig: Take 1 capsule by mouth daily   SUMAtriptan (IMITREX) 50 MG tablet   Yes No   Sig: Take 50 mg by mouth at onset of headache   VITAMIN D, CHOLECALCIFEROL, PO   Yes No   Sig: Take 4,000 Units by mouth daily   atorvastatin (LIPITOR) 40 MG tablet   Yes No   Sig: Take 40 mg by mouth daily  "  cyclobenzaprine (FLEXERIL) 5 MG tablet   Yes No   Sig: Take 5 mg by mouth as needed   escitalopram (LEXAPRO) 20 MG tablet   Yes No   Sig: Take 20 mg by mouth daily   famotidine (PEPCID) 20 MG tablet   No No   Sig: Take 1 tablet (20 mg) by mouth 2 times daily   omeprazole (PRILOSEC) 40 MG DR capsule   Yes No   Sig: Take 40 mg by mouth daily   ondansetron (ZOFRAN ODT) 8 MG ODT tab   Yes No   Sig: Take 8 mg by mouth as needed   oxyCODONE-acetaminophen (PERCOCET) 5-325 MG tablet   No No   Sig: Take 1-2 tablets by mouth every 4 hours as needed for pain   rosuvastatin (CRESTOR) 20 MG tablet   No No   Sig: Take 1 tablet (20 mg) by mouth daily   sucralfate (CARAFATE) 1 GM/10ML suspension   No No   Sig: Take 10 mLs (1 g) by mouth 4 times daily as needed (pain)   testosterone cypionate (DEPOTESTOSTERONE) 200 MG/ML injection   Yes No   Sig: Inject 200 mg into the muscle once a week   zinc gluconate 50 MG tablet   Yes No   Sig: Take 50 mg by mouth daily      Facility-Administered Medications: None       ALLERGIES:  Allergies   Allergen Reactions    Caffeine Other (See Comments)     Makes him anxious and hyper     Makes him anxious and hyper    Codeine Sulfate     Dilaudid Cough     Varenicline Other (See Comments)     Caused extreme irritability       FAMILY HISTORY:  No family history on file.    SOCIAL HISTORY:  Social History     Tobacco Use    Smoking status: Former     Current packs/day: 0.00     Types: Cigarettes     Quit date: 7/3/2012     Years since quittin.3    Smokeless tobacco: Never   Vaping Use    Vaping status: Never Used   Substance Use Topics    Alcohol use: Yes     Comment: social     Drug use: Never        VITALS:    First Vitals:  No data found.      No data found.        PHYSICAL EXAM  VITAL SIGNS: /68   Pulse 57   Temp 98.2  F (36.8  C) (Oral)   Resp 20   Ht 1.626 m (5' 4\")   Wt 65.8 kg (145 lb)   SpO2 98%   BMI 24.89 kg/m     GENERAL: Awake, alert, answering questions appropriately, in " no acute distress.  HEENT: Sclera antiicteric.   SPEECH:  Easy to understand speech, Normal volume and rianna. Normal phonation.  PULMONARY: Respirations even, unlabored, equal chest rise, in no acute respiratory distress.  CARDIOVASCULAR: Distal perfusion appears intact, no cyanosis  ABDOMINAL: Nondistended.  EXTREMITIES: Extremities are warm and well perfused.   Left lower extremity:  No signs of gross bony deformity or serious injury.  There is bony tenderness with palpation of posterior lateral mallolus and overlying ATFL and left lateral midfoot.  Strength 5/5 with plantarflexion and dorsiflexion, flexion and extension at the knee.  Swelling present corresponding with area pain over the ATFL, no increased warmth or overlying skin changes including no erythema.  Sensation grossly intact to light touch.  Distal extremity is warm with DP and posterior tibialis pulses intact. No increased laxity at the ankle or knee.  No bony tenderness to the forefoot, tib-fib, or knee.  Compartments are soft.  NEUROLOGIC: GCS 15. Alert, oriented. CN III-XII grossly intact. Moving all extremities spontaneously.   SKIN: Exposed areas of skin warm, dry, no rashes.  PSYCH: Normal mood and affect.       RADIOLOGY/LAB:  Reviewed all pertinent imaging. Please see official radiology report.  All pertinent labs reviewed and interpreted.  Results for orders placed or performed during the hospital encounter of 10/24/24   XR Ankle Left G/E 3 Views    Impression    IMPRESSION: Normal joint spaces and alignment. No fracture.   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 Minimal osteoarthrosis of the 1st MTP joint. Small type I accessory navicular incidentally noted. Otherwise negative. There is no evidence of fracture.           EKG:  None      PROCEDURES:  None      Medical Decision Making  Obtained supplemental history:Supplemental history obtained?: No  Reviewed external  records: External records reviewed?: No  Care impacted by chronic illness:Documented in Chart  Care significantly affected by social determinants of health:N/A  Did you consider but not order tests?: Work up considered but not performed and documented in chart, if applicable  Did you interpret images independently?: Independent interpretation of ECG and images noted in documentation, when applicable.  Consultation discussion with other provider:Did you involve another provider (consultant, , pharmacy, etc.)?: No  Discharge. No recommendations on prescription strength medication(s). See documentation for any additional details.    Not Applicable    CRITICAL CARE:  Not applicable          Lauren Bailey PA-C  Emergency Medicine  Health system EMERGENCY ROOM  [facility] [facility] [address]  [phone]  Dept: [phone]     Lauren Bailey PA-C  11/04/24 2015

## 2024-10-24 NOTE — Clinical Note
Harley Isidro was seen and treated in our emergency department on 10/24/2024.  He may return to work on 10/29/2024.       If you have any questions or concerns, please don't hesitate to call.      Lauren Bailey PA-C

## 2024-10-24 NOTE — DISCHARGE INSTRUCTIONS
You were seen in the ER today for evaluation of ankle pain.  Your x-rays do not show fracture or dislocation.  This is likely a sprain that will heal in the next 1 to 2 weeks.  Treat your pain using the rice method (rest, ice, compression, elevation).    You may take Tylenol and ibuprofen for pain, do not exceed 4000 mg of Tylenol per day or 3200 mg of ibuprofen per day.  Apply ice to the painful areas for 20 minutes per hour each hour.  Compress using a brace during the day, take off at night to let your skin breathe.    Follow-up with [facility] if your symptoms do not improve in the next few days.  Also recommend following up with your primary care provider.  Return to the ER if you develop any new or worsening symptoms like severe pain, fever, excessive swelling or discoloration, loss of sensation or function in your foot, or any other symptoms that concern you.

## 2024-10-24 NOTE — ED TRIAGE NOTES
Pt presents to the ED with complaints of L ankle pain that started after he twisted his ankle and dropped a window screen on it.     Triage Assessment (Adult)       Row Name 10/24/24 1002          Triage Assessment    Airway WDL WDL        Respiratory WDL    Respiratory WDL WDL        Skin Circulation/Temperature WDL    Skin Circulation/Temperature WDL WDL        Cardiac WDL    Cardiac WDL WDL        Peripheral/Neurovascular WDL    Peripheral Neurovascular WDL WDL        Cognitive/Neuro/Behavioral WDL    Cognitive/Neuro/Behavioral WDL WDL

## 2025-02-23 ENCOUNTER — HOSPITAL ENCOUNTER (EMERGENCY)
Facility: CLINIC | Age: 55
Discharge: HOME | End: 2025-02-23
Payer: COMMERCIAL

## 2025-02-23 ENCOUNTER — APPOINTMENT (OUTPATIENT)
Dept: RADIOLOGY | Facility: CLINIC | Age: 55
End: 2025-02-23
Payer: COMMERCIAL

## 2025-02-23 VITALS
OXYGEN SATURATION: 96 % | RESPIRATION RATE: 20 BRPM | SYSTOLIC BLOOD PRESSURE: 136 MMHG | WEIGHT: 146 LBS | DIASTOLIC BLOOD PRESSURE: 71 MMHG | BODY MASS INDEX: 24.92 KG/M2 | HEART RATE: 62 BPM | HEIGHT: 64 IN | TEMPERATURE: 98.1 F

## 2025-02-23 DIAGNOSIS — J06.9 URI WITH COUGH AND CONGESTION: ICD-10-CM

## 2025-02-23 LAB
FLUAV RNA SPEC QL NAA+PROBE: NEGATIVE
FLUBV RNA RESP QL NAA+PROBE: NEGATIVE
RSV RNA SPEC NAA+PROBE: NEGATIVE
SARS-COV-2 RNA RESP QL NAA+PROBE: NEGATIVE

## 2025-02-23 PROCEDURE — 71046 X-RAY EXAM CHEST 2 VIEWS: CPT

## 2025-02-23 PROCEDURE — 99284 EMERGENCY DEPT VISIT MOD MDM: CPT | Mod: 25

## 2025-02-23 PROCEDURE — 250N000011 HC RX IP 250 OP 636

## 2025-02-23 PROCEDURE — 87637 SARSCOV2&INF A&B&RSV AMP PRB: CPT | Performed by: EMERGENCY MEDICINE

## 2025-02-23 RX ORDER — ONDANSETRON 4 MG/1
4 TABLET, ORALLY DISINTEGRATING ORAL EVERY 8 HOURS PRN
Qty: 10 TABLET | Refills: 0 | Status: SHIPPED | OUTPATIENT
Start: 2025-02-23 | End: 2025-02-26

## 2025-02-23 RX ORDER — ONDANSETRON 4 MG/1
4 TABLET, ORALLY DISINTEGRATING ORAL ONCE
Status: COMPLETED | OUTPATIENT
Start: 2025-02-23 | End: 2025-02-23

## 2025-02-23 RX ADMIN — ONDANSETRON 4 MG: 4 TABLET, ORALLY DISINTEGRATING ORAL at 15:08

## 2025-02-23 ASSESSMENT — ACTIVITIES OF DAILY LIVING (ADL)
ADLS_ACUITY_SCORE: 41
ADLS_ACUITY_SCORE: 41

## 2025-02-23 ASSESSMENT — COLUMBIA-SUICIDE SEVERITY RATING SCALE - C-SSRS
2. HAVE YOU ACTUALLY HAD ANY THOUGHTS OF KILLING YOURSELF IN THE PAST MONTH?: NO
6. HAVE YOU EVER DONE ANYTHING, STARTED TO DO ANYTHING, OR PREPARED TO DO ANYTHING TO END YOUR LIFE?: NO
1. IN THE PAST MONTH, HAVE YOU WISHED YOU WERE DEAD OR WISHED YOU COULD GO TO SLEEP AND NOT WAKE UP?: NO

## 2025-02-23 NOTE — ED TRIAGE NOTES
Pt presents to the ED with c/o worsening cough since 2/19/25. Pt seen for this and has been taking cough suppressant and prednisone with minimal improvement. Pt c/o increased pleurisy and productive cough.

## 2025-02-23 NOTE — Clinical Note
Harley Isidro was seen and treated in our emergency department on 2/23/2025.  He may return to work on 02/26/2025.       If you have any questions or concerns, please don't hesitate to call.      Nat Hernadez PA-C

## 2025-02-23 NOTE — ED PROVIDER NOTES
Emergency Department Encounter   NAME: Harley Isidro  AGE: 54 year old adult   YOB: 1970 ;   MRN: [id number] ;    ED PROVIDER: Nat Hernadez PA-C    PCP: [facility]    Evaluation Date & Time:   2/23/2025  2:09 PM    CHIEF COMPLAINT:  Cough and Fever      FINAL IMPRESSION:    ICD-10-CM    1. URI with cough and congestion  J06.9             IMPRESSION AND PLAN   MDM: Harley Isidro is a 54 year old adult with a pertinent history of HLD who presents to the ED by walk-in for evaluation of congestion, productive cough x 4 days.    Vitals stable. On exam patient is ill-appearing but in no acute distress. Pulmonary exam with mildly decreased breath sounds diffusely, but no evidence of wheezing, rhonchi or stridor. Patient satting appropriately at 96% RA. Obvious congestion present but no maxillary or facial sinus tenderness. R ear with small effusion, no evidence of infection. Differentials include URI, covid, influenza, rsv, pneumonia. PERC negative. No hx of CHF. History of tobacco dependence but no longer smoking for the last 2 years. Symptoms < 10 days, do not feel that antibiotic for sinusitis is appropriate.  No chest pain concerning for pericarditis, myositis, costochondritis.     Viral swab negative. CXR negative, no focal infiltrates to suggest pneumonia, no pleural effusion or pneumothorax appreciated.  Upon reevaluation, patient is resting comfortably in his hospital bed and remains vitally stable.  I discussed with him his grossly unremarkable workup and provided reassurance.  I encouraged him to continue the prednisone and cough medicine prescribed to him by his primary care provider.  I also recommended he started a decongestant to help with both the congestion and the middle ear effusion.  Additionally, I did encourage Tylenol and ibuprofen for any fevers and bodyaches.  Patient overall agreeable with this plan and feels comfortable discharge at this  time.      Medical Decision Making  Obtained supplemental history:Supplemental history obtained?: No  Reviewed external records: External records reviewed?: Documented in chart  Care impacted by chronic illness:Documented in Chart  Care significantly affected by social determinants of health:Access to Medical Care  Did you consider but not order tests?: Work up considered but not performed and documented in chart, if applicable  Did you interpret images independently?: Independent interpretation of ECG and images noted in documentation, when applicable.  Consultation discussion with other provider:Did you involve another provider (consultant, , pharmacy, etc.)?: No  Discharge. I prescribed additional prescription strength medication(s) as charted. See documentation for any additional details.    Not Applicable       ED COURSE:  2:21 PM I met and introduced myself to the patient. I gathered initial history and performed my physical exam. We discussed plan for initial workup.   4:02 PM I rechecked the patient and discussed results, discharge, follow up, and reasons to return to the ED.           MEDICATIONS GIVEN IN THE EMERGENCY DEPARTMENT:  Medications   ondansetron (ZOFRAN ODT) ODT tab 4 mg (4 mg Oral $Given 2/23/25 7005)         NEW PRESCRIPTIONS STARTED AT TODAY'S ED VISIT:  New Prescriptions    ONDANSETRON (ZOFRAN ODT) 4 MG ODT TAB    Take 1 tablet (4 mg) by mouth every 8 hours as needed for nausea.         BRIEF HPI   Patient information was obtained from: Patient   Use of Intrepreter: N/A     Ace D Sanna is a 54 year old male who presents to the ED for productive cough and fevers.  Patient was seen by his primary care provider on 2/20/2025.  He was diagnosed with URI and prescribed prednisone and a cough medicine due to suspected bronchitis.  Patient has been taking these medications over the last 3 days without any relief in his symptoms.  He does continue to endorse productive cough, fevers, fatigue,  "congestion and right ear popping.  He states he may have been in contact with some sick coworkers recently.  Patient states he did receive his annual flu shot but did not receive his COVID-vaccine.  Patient otherwise denies nausea, vomiting, diarrhea, constipation or urinary symptoms.      REVIEW OF SYSTEMS:  Pertinent positive and negative symptoms per HPI.       MEDICAL HISTORY     No past medical history on file.    No past surgical history on file.    No family history on file.    Social History     Tobacco Use    Smoking status: Former     Current packs/day: 0.00     Types: Cigarettes     Quit date: 7/3/2012     Years since quittin.6    Smokeless tobacco: Never   Vaping Use    Vaping status: Never Used   Substance Use Topics    Alcohol use: Yes     Comment: social     Drug use: Never         PHYSICAL EXAM     First Vitals:  Patient Vitals for the past 24 hrs:   BP Temp Temp src Pulse Resp SpO2 Height Weight   25 1545 136/71 -- -- -- -- -- -- --   25 1540 -- -- -- 62 -- 96 % -- --   25 1347 136/69 98.1  F (36.7  C) Oral 68 20 96 % 1.626 m (5' 4\") 66.2 kg (146 lb)       PHYSICAL EXAM:  Physical Exam  Vitals and nursing note reviewed.   Constitutional:       General: He is not in acute distress.     Appearance: Normal appearance. He is normal weight. He is ill-appearing. He is not toxic-appearing or diaphoretic.   HENT:      Head: Normocephalic and atraumatic.      Right Ear: Ear canal and external ear normal. A middle ear effusion is present.      Left Ear: Tympanic membrane, ear canal and external ear normal.      Nose: Congestion present.      Mouth/Throat:      Mouth: Mucous membranes are moist.      Pharynx: Oropharynx is clear. No oropharyngeal exudate or posterior oropharyngeal erythema.   Eyes:      Conjunctiva/sclera: Conjunctivae normal.   Cardiovascular:      Rate and Rhythm: Normal rate and regular rhythm.      Heart sounds: Normal heart sounds.   Pulmonary:      Effort: " Pulmonary effort is normal. No respiratory distress.      Breath sounds: No stridor. Decreased breath sounds present. No wheezing or rhonchi.   Musculoskeletal:         General: Normal range of motion.      Cervical back: Normal range of motion and neck supple.   Skin:     General: Skin is warm and dry.   Neurological:      General: No focal deficit present.      Mental Status: He is alert and oriented to person, place, and time.   Psychiatric:         Mood and Affect: Mood normal.          RESULTS     LAB:  All pertinent labs reviewed and interpreted  Labs Ordered and Resulted from Time of ED Arrival to Time of ED Departure   INFLUENZA A/B, RSV AND SARS-COV2 PCR - Normal       Result Value    Influenza A PCR Negative      Influenza B PCR Negative      RSV PCR Negative      SARS CoV2 PCR Negative         RADIOLOGY:  XR Chest 2 Views   Final Result   IMPRESSION: Negative chest.              Nat Hernadez PA-C  Emergency Medicine   [facility] EMERGENCY ROOM       Nat Hernadez PA-C  02/23/25 8271

## 2025-02-23 NOTE — Clinical Note
Harley Isidro was seen and treated in our emergency department on 2/23/2025.  He may return to work on 02/11/2025.       If you have any questions or concerns, please don't hesitate to call.      Nat Hernadez PA-C

## 2025-02-23 NOTE — ED NOTES
Pt remains A&O, reports improvement in his nausea after the zofran. Still has congested-sounding cough, is able to tolerate PO fluids. This writer updated him on negative chest XR. Ambulated to the bathroom with standby assist, moving well.

## 2025-02-23 NOTE — DISCHARGE INSTRUCTIONS
You likely have a viral URI causing your cough and congestion. I recommend continuing the cough medicine, tylenol, ibuprofen and decongestants to dry up the mucous and fluids in your nose/ears. Please return to the ED for any new or worsening symptoms.

## 2025-04-17 ENCOUNTER — TRANSFERRED RECORDS (OUTPATIENT)
Dept: HEALTH INFORMATION MANAGEMENT | Facility: CLINIC | Age: 55
End: 2025-04-17
Payer: COMMERCIAL

## 2025-08-08 ENCOUNTER — APPOINTMENT (OUTPATIENT)
Dept: CT IMAGING | Facility: CLINIC | Age: 55
End: 2025-08-08
Attending: EMERGENCY MEDICINE
Payer: OTHER MISCELLANEOUS

## 2025-08-08 ENCOUNTER — HOSPITAL ENCOUNTER (EMERGENCY)
Facility: CLINIC | Age: 55
Discharge: HOME | End: 2025-08-08
Attending: EMERGENCY MEDICINE
Payer: OTHER MISCELLANEOUS

## 2025-08-08 VITALS
HEART RATE: 53 BPM | SYSTOLIC BLOOD PRESSURE: 117 MMHG | DIASTOLIC BLOOD PRESSURE: 60 MMHG | BODY MASS INDEX: 25.61 KG/M2 | RESPIRATION RATE: 18 BRPM | OXYGEN SATURATION: 96 % | HEIGHT: 64 IN | TEMPERATURE: 97.9 F | WEIGHT: 150 LBS
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
## 2025-08-08 DIAGNOSIS — S09.90XA CLOSED HEAD INJURY, INITIAL ENCOUNTER: Primary | ICD-10-CM

## 2025-08-08 PROBLEM — F33.0 MILD RECURRENT MAJOR DEPRESSION: Status: ACTIVE | Noted: 2020-05-17

## 2025-08-08 PROCEDURE — 250N000013 HC RX MED GY IP 250 OP 250 PS 637: Performed by: EMERGENCY MEDICINE

## 2025-08-08 PROCEDURE — 70450 CT HEAD/BRAIN W/O DYE: CPT

## 2025-08-08 PROCEDURE — 99284 EMERGENCY DEPT VISIT MOD MDM: CPT | Mod: 25 | Performed by: EMERGENCY MEDICINE

## 2025-08-08 RX ORDER — ACETAMINOPHEN 325 MG/1
975 TABLET ORAL ONCE
Status: COMPLETED | OUTPATIENT
Start: 2025-08-08 | End: 2025-08-08

## 2025-08-08 RX ADMIN — ACETAMINOPHEN 975 MG: 325 TABLET ORAL at 08:31

## 2025-08-08 ASSESSMENT — ACTIVITIES OF DAILY LIVING (ADL): ADLS_ACUITY_SCORE: 41

## 2025-08-11 ENCOUNTER — PATIENT OUTREACH (OUTPATIENT)
Dept: CARE COORDINATION | Facility: CLINIC | Age: 55
End: 2025-08-11
Payer: COMMERCIAL

## 2025-08-13 ENCOUNTER — PATIENT OUTREACH (OUTPATIENT)
Dept: CARE COORDINATION | Facility: CLINIC | Age: 55
End: 2025-08-13
Payer: COMMERCIAL